# Patient Record
Sex: MALE | Race: WHITE | NOT HISPANIC OR LATINO | Employment: PART TIME | URBAN - METROPOLITAN AREA
[De-identification: names, ages, dates, MRNs, and addresses within clinical notes are randomized per-mention and may not be internally consistent; named-entity substitution may affect disease eponyms.]

---

## 2020-09-22 ENCOUNTER — APPOINTMENT (OUTPATIENT)
Dept: GENERAL RADIOLOGY | Facility: HOSPITAL | Age: 70
End: 2020-09-22

## 2020-09-22 ENCOUNTER — HOSPITAL ENCOUNTER (EMERGENCY)
Facility: HOSPITAL | Age: 70
Discharge: HOME | End: 2020-09-22
Attending: EMERGENCY MEDICINE | Admitting: EMERGENCY MEDICINE

## 2020-09-22 VITALS
HEIGHT: 72 IN | TEMPERATURE: 98.6 F | HEART RATE: 80 BPM | SYSTOLIC BLOOD PRESSURE: 128 MMHG | WEIGHT: 150 LBS | DIASTOLIC BLOOD PRESSURE: 82 MMHG | OXYGEN SATURATION: 92 % | RESPIRATION RATE: 16 BRPM | BODY MASS INDEX: 20.32 KG/M2

## 2020-09-22 DIAGNOSIS — N39.0 RECURRENT UTI: Primary | ICD-10-CM

## 2020-09-22 DIAGNOSIS — R11.2 NON-INTRACTABLE VOMITING WITH NAUSEA, UNSPECIFIED VOMITING TYPE: ICD-10-CM

## 2020-09-22 DIAGNOSIS — R41.0 CONFUSION: ICD-10-CM

## 2020-09-22 LAB
ALBUMIN SERPL-MCNC: 3.4 G/DL (ref 3.5–5.2)
ALBUMIN/GLOB SERPL: 1.1 G/DL
ALP SERPL-CCNC: 67 U/L (ref 39–117)
ALT SERPL W P-5'-P-CCNC: 25 U/L (ref 1–41)
ANION GAP SERPL CALCULATED.3IONS-SCNC: 11 MMOL/L (ref 5–15)
AST SERPL-CCNC: 30 U/L (ref 1–40)
BACTERIA UR QL AUTO: ABNORMAL /HPF
BASOPHILS # BLD AUTO: 0.02 10*3/MM3 (ref 0–0.2)
BASOPHILS NFR BLD AUTO: 0.2 % (ref 0–1.5)
BILIRUB SERPL-MCNC: 1 MG/DL (ref 0–1.2)
BILIRUB UR QL STRIP: NEGATIVE
BUN SERPL-MCNC: 21 MG/DL (ref 8–23)
BUN/CREAT SERPL: 17.8 (ref 7–25)
CALCIUM SPEC-SCNC: 9.3 MG/DL (ref 8.6–10.5)
CHLORIDE SERPL-SCNC: 94 MMOL/L (ref 98–107)
CLARITY UR: ABNORMAL
CO2 SERPL-SCNC: 28 MMOL/L (ref 22–29)
COLOR UR: YELLOW
CREAT SERPL-MCNC: 1.18 MG/DL (ref 0.76–1.27)
DEPRECATED RDW RBC AUTO: 42.6 FL (ref 37–54)
EOSINOPHIL # BLD AUTO: 0.01 10*3/MM3 (ref 0–0.4)
EOSINOPHIL NFR BLD AUTO: 0.1 % (ref 0.3–6.2)
ERYTHROCYTE [DISTWIDTH] IN BLOOD BY AUTOMATED COUNT: 12.7 % (ref 12.3–15.4)
GFR SERPL CREATININE-BSD FRML MDRD: 61 ML/MIN/1.73
GFR SERPL CREATININE-BSD FRML MDRD: 74 ML/MIN/1.73
GLOBULIN UR ELPH-MCNC: 3.2 GM/DL
GLUCOSE SERPL-MCNC: 136 MG/DL (ref 65–99)
GLUCOSE UR STRIP-MCNC: NEGATIVE MG/DL
HCT VFR BLD AUTO: 40.7 % (ref 37.5–51)
HGB BLD-MCNC: 13.5 G/DL (ref 13–17.7)
HGB UR QL STRIP.AUTO: ABNORMAL
HOLD SPECIMEN: NORMAL
HOLD SPECIMEN: NORMAL
HYALINE CASTS UR QL AUTO: ABNORMAL /LPF
IMM GRANULOCYTES # BLD AUTO: 0.07 10*3/MM3 (ref 0–0.05)
IMM GRANULOCYTES NFR BLD AUTO: 0.9 % (ref 0–0.5)
KETONES UR QL STRIP: NEGATIVE
LEUKOCYTE ESTERASE UR QL STRIP.AUTO: ABNORMAL
LYMPHOCYTES # BLD AUTO: 0.72 10*3/MM3 (ref 0.7–3.1)
LYMPHOCYTES NFR BLD AUTO: 8.8 % (ref 19.6–45.3)
MAGNESIUM SERPL-MCNC: 2 MG/DL (ref 1.6–2.4)
MCH RBC QN AUTO: 30.1 PG (ref 26.6–33)
MCHC RBC AUTO-ENTMCNC: 33.2 G/DL (ref 31.5–35.7)
MCV RBC AUTO: 90.8 FL (ref 79–97)
MONOCYTES # BLD AUTO: 0.8 10*3/MM3 (ref 0.1–0.9)
MONOCYTES NFR BLD AUTO: 9.8 % (ref 5–12)
NEUTROPHILS NFR BLD AUTO: 6.57 10*3/MM3 (ref 1.7–7)
NEUTROPHILS NFR BLD AUTO: 80.2 % (ref 42.7–76)
NITRITE UR QL STRIP: POSITIVE
NRBC BLD AUTO-RTO: 0 /100 WBC (ref 0–0.2)
PH UR STRIP.AUTO: 5.5 [PH] (ref 5–8)
PLATELET # BLD AUTO: 127 10*3/MM3 (ref 140–450)
PMV BLD AUTO: 11 FL (ref 6–12)
POTASSIUM SERPL-SCNC: 3.5 MMOL/L (ref 3.5–5.2)
PROT SERPL-MCNC: 6.6 G/DL (ref 6–8.5)
PROT UR QL STRIP: ABNORMAL
RBC # BLD AUTO: 4.48 10*6/MM3 (ref 4.14–5.8)
RBC # UR: ABNORMAL /HPF
REF LAB TEST METHOD: ABNORMAL
SODIUM SERPL-SCNC: 133 MMOL/L (ref 136–145)
SP GR UR STRIP: 1.01 (ref 1–1.03)
SQUAMOUS #/AREA URNS HPF: ABNORMAL /HPF
TROPONIN T SERPL-MCNC: <0.01 NG/ML (ref 0–0.03)
UROBILINOGEN UR QL STRIP: ABNORMAL
WBC # BLD AUTO: 8.19 10*3/MM3 (ref 3.4–10.8)
WBC UR QL AUTO: ABNORMAL /HPF
WHOLE BLOOD HOLD SPECIMEN: NORMAL
WHOLE BLOOD HOLD SPECIMEN: NORMAL

## 2020-09-22 PROCEDURE — 81001 URINALYSIS AUTO W/SCOPE: CPT | Performed by: EMERGENCY MEDICINE

## 2020-09-22 PROCEDURE — 71045 X-RAY EXAM CHEST 1 VIEW: CPT

## 2020-09-22 PROCEDURE — 83735 ASSAY OF MAGNESIUM: CPT | Performed by: EMERGENCY MEDICINE

## 2020-09-22 PROCEDURE — 84484 ASSAY OF TROPONIN QUANT: CPT | Performed by: EMERGENCY MEDICINE

## 2020-09-22 PROCEDURE — 80053 COMPREHEN METABOLIC PANEL: CPT | Performed by: EMERGENCY MEDICINE

## 2020-09-22 PROCEDURE — 87086 URINE CULTURE/COLONY COUNT: CPT | Performed by: EMERGENCY MEDICINE

## 2020-09-22 PROCEDURE — 99285 EMERGENCY DEPT VISIT HI MDM: CPT

## 2020-09-22 PROCEDURE — 93005 ELECTROCARDIOGRAM TRACING: CPT | Performed by: EMERGENCY MEDICINE

## 2020-09-22 PROCEDURE — 96365 THER/PROPH/DIAG IV INF INIT: CPT

## 2020-09-22 PROCEDURE — 87186 SC STD MICRODIL/AGAR DIL: CPT | Performed by: EMERGENCY MEDICINE

## 2020-09-22 PROCEDURE — 25010000002 CEFTRIAXONE PER 250 MG: Performed by: EMERGENCY MEDICINE

## 2020-09-22 PROCEDURE — 85025 COMPLETE CBC W/AUTO DIFF WBC: CPT | Performed by: EMERGENCY MEDICINE

## 2020-09-22 RX ORDER — SIMVASTATIN 40 MG
40 TABLET ORAL NIGHTLY
COMMUNITY

## 2020-09-22 RX ORDER — TAMSULOSIN HYDROCHLORIDE 0.4 MG/1
1 CAPSULE ORAL DAILY
COMMUNITY
End: 2023-03-17 | Stop reason: SDUPTHER

## 2020-09-22 RX ORDER — ASPIRIN 81 MG/1
81 TABLET, CHEWABLE ORAL 2 TIMES DAILY
COMMUNITY

## 2020-09-22 RX ORDER — PREDNISONE 10 MG/1
10 TABLET ORAL DAILY
COMMUNITY
End: 2021-06-03

## 2020-09-22 RX ORDER — POTASSIUM CHLORIDE 1500 MG/1
20 TABLET, FILM COATED, EXTENDED RELEASE ORAL DAILY
COMMUNITY
End: 2020-09-22

## 2020-09-22 RX ORDER — GUAIFENESIN 600 MG/1
1200 TABLET, EXTENDED RELEASE ORAL 2 TIMES DAILY
COMMUNITY
End: 2021-06-03

## 2020-09-22 RX ORDER — POTASSIUM CHLORIDE 750 MG/1
20 TABLET, FILM COATED, EXTENDED RELEASE ORAL 2 TIMES DAILY
COMMUNITY
End: 2021-06-03

## 2020-09-22 RX ORDER — CEFDINIR 300 MG/1
300 CAPSULE ORAL 2 TIMES DAILY
Qty: 14 CAPSULE | Refills: 0 | Status: SHIPPED | OUTPATIENT
Start: 2020-09-22 | End: 2022-10-25

## 2020-09-22 RX ORDER — CARVEDILOL 6.25 MG/1
6.25 TABLET ORAL 2 TIMES DAILY WITH MEALS
COMMUNITY
End: 2021-06-03 | Stop reason: SDUPTHER

## 2020-09-22 RX ORDER — SODIUM CHLORIDE 0.9 % (FLUSH) 0.9 %
10 SYRINGE (ML) INJECTION AS NEEDED
Status: DISCONTINUED | OUTPATIENT
Start: 2020-09-22 | End: 2020-09-22 | Stop reason: HOSPADM

## 2020-09-22 RX ORDER — ALBUTEROL SULFATE 90 UG/1
2 AEROSOL, METERED RESPIRATORY (INHALATION) EVERY 4 HOURS PRN
COMMUNITY

## 2020-09-22 RX ORDER — ONDANSETRON 4 MG/1
4 TABLET, FILM COATED ORAL EVERY 8 HOURS PRN
Qty: 10 TABLET | Refills: 0 | Status: SHIPPED | OUTPATIENT
Start: 2020-09-22 | End: 2022-10-25

## 2020-09-22 RX ADMIN — CEFTRIAXONE SODIUM 2 G: 2 INJECTION, POWDER, FOR SOLUTION INTRAMUSCULAR; INTRAVENOUS at 17:19

## 2020-09-22 NOTE — DISCHARGE INSTRUCTIONS
You need to be rechecked by your primary care provider in 2-3 days.  If you have a bacteria found by culture that will require a different antibiotic than I prescribed, we will call you to make the change.

## 2020-09-22 NOTE — ED PROVIDER NOTES
Subjective   Mr. Condon is a 69 yo male who arrives by EMS with a very confusing story. He tells me that he doesn't want happening to him what has happened to him before then brings up a heart attack and bladder infection requiring admission to [place] months ago. He was told his heart was only working at 12% and that he did have to wear a Life Vest. He does have periodic problems with nausea, sometimes vomiting, but he downplays those symptoms. He says he didn't call EMS and doesn't know why people who look after him did; he just figures that they wanted him to come. He currently denies chest pain, any new trouble breathing, and has not vomited today. No dysuria.       History limited by: vague historian.      Review of Systems   Constitutional: Positive for appetite change (decreased appetite). Negative for fever.   HENT: Negative.    Respiratory: Positive for shortness of breath (chronically, which he blames on COPD).    Cardiovascular: Positive for leg swelling (sometimes). Negative for chest pain.   Gastrointestinal: Positive for diarrhea (he denies diarrhea to me but triage note mentions diarrhea), nausea and vomiting (some days). Negative for abdominal pain.   Genitourinary: Negative for difficulty urinating and dysuria.   Musculoskeletal: Negative for back pain.   Neurological:        He is noting some memory problems.   Psychiatric/Behavioral: Positive for confusion.   All other systems reviewed and are negative.      Past Medical History:   Diagnosis Date   • COPD (chronic obstructive pulmonary disease) (CMS/HCC)    • Heart attack (CMS/HCC)     4-5 months ago   • Hypertension        Not on File    History reviewed. No pertinent surgical history.    History reviewed. No pertinent family history.    Social History     Socioeconomic History   • Marital status:      Spouse name: Not on file   • Number of children: Not on file   • Years of education: Not on file   • Highest education level: Not on  file   Tobacco Use   • Smoking status: Former Smoker   • Smokeless tobacco: Never Used   Substance and Sexual Activity   • Alcohol use: Never     Frequency: Never   • Drug use: Never   • Sexual activity: Not Currently           Objective   Physical Exam  Vitals signs and nursing note reviewed.   Constitutional:       General: He is not in acute distress.     Appearance: He is normal weight.      Comments: Pleasant male who can't tell me why he is here other than that someone else thought he needed to be seen here.   HENT:      Head: Atraumatic.      Mouth/Throat:      Comments: Airway patent    Eyes:      General: No scleral icterus.     Conjunctiva/sclera: Conjunctivae normal.   Neck:      Musculoskeletal: Neck supple.      Trachea: Phonation normal.   Cardiovascular:      Rate and Rhythm: Normal rate and regular rhythm.      Heart sounds: Normal heart sounds.   Pulmonary:      Effort: Pulmonary effort is normal. No respiratory distress.      Breath sounds: Normal breath sounds.   Abdominal:      Palpations: Abdomen is soft.      Tenderness: There is no abdominal tenderness.   Musculoskeletal:      Right lower leg: No edema.      Left lower leg: No edema.   Skin:     General: Skin is warm and dry.   Neurological:      Mental Status: He is alert.      Comments: He appears confused with difficulty relating incidents leading to EMS call today.   Psychiatric:         Behavior: Behavior normal.         Procedures           ED Course  ED Course as of Sep 22 1946   Tue Sep 22, 2020   1606 I have reviewed records from [place] admission in January, 2020. He went in with respiratory failure, NSTEMI with EF 15-20%, COPD, UTI, had heart cath showing no stentable lesions, diffuse disease.    [LI]   1704 Only significant finding here is UTI, culture pending. His wife is not hear, nurse FREDA Dasilva is trying to find her. It appears he will need outpatient antibiotics and PCP follow-up.    [LI]      ED Course User Index  [LI] Abilio  Arslan RODRÍGUEZ MD                                   Recent Results (from the past 24 hour(s))   Comprehensive Metabolic Panel    Collection Time: 09/22/20  2:49 PM    Specimen: Blood   Result Value Ref Range    Glucose 136 (H) 65 - 99 mg/dL    BUN 21 8 - 23 mg/dL    Creatinine 1.18 0.76 - 1.27 mg/dL    Sodium 133 (L) 136 - 145 mmol/L    Potassium 3.5 3.5 - 5.2 mmol/L    Chloride 94 (L) 98 - 107 mmol/L    CO2 28.0 22.0 - 29.0 mmol/L    Calcium 9.3 8.6 - 10.5 mg/dL    Total Protein 6.6 6.0 - 8.5 g/dL    Albumin 3.40 (L) 3.50 - 5.20 g/dL    ALT (SGPT) 25 1 - 41 U/L    AST (SGOT) 30 1 - 40 U/L    Alkaline Phosphatase 67 39 - 117 U/L    Total Bilirubin 1.0 0.0 - 1.2 mg/dL    eGFR Non African Amer 61 >60 mL/min/1.73    eGFR  African Amer 74 >60 mL/min/1.73    Globulin 3.2 gm/dL    A/G Ratio 1.1 g/dL    BUN/Creatinine Ratio 17.8 7.0 - 25.0    Anion Gap 11.0 5.0 - 15.0 mmol/L   Troponin    Collection Time: 09/22/20  2:49 PM    Specimen: Blood   Result Value Ref Range    Troponin T <0.010 0.000 - 0.030 ng/mL   Magnesium    Collection Time: 09/22/20  2:49 PM    Specimen: Blood   Result Value Ref Range    Magnesium 2.0 1.6 - 2.4 mg/dL   Light Blue Top    Collection Time: 09/22/20  2:49 PM   Result Value Ref Range    Extra Tube hold for add-on    Green Top (Gel)    Collection Time: 09/22/20  2:49 PM   Result Value Ref Range    Extra Tube Hold for add-ons.    Lavender Top    Collection Time: 09/22/20  2:49 PM   Result Value Ref Range    Extra Tube hold for add-on    Gold Top - SST    Collection Time: 09/22/20  2:49 PM   Result Value Ref Range    Extra Tube Hold for add-ons.    CBC Auto Differential    Collection Time: 09/22/20  2:49 PM    Specimen: Blood   Result Value Ref Range    WBC 8.19 3.40 - 10.80 10*3/mm3    RBC 4.48 4.14 - 5.80 10*6/mm3    Hemoglobin 13.5 13.0 - 17.7 g/dL    Hematocrit 40.7 37.5 - 51.0 %    MCV 90.8 79.0 - 97.0 fL    MCH 30.1 26.6 - 33.0 pg    MCHC 33.2 31.5 - 35.7 g/dL    RDW 12.7 12.3 - 15.4 %    RDW-SD  42.6 37.0 - 54.0 fl    MPV 11.0 6.0 - 12.0 fL    Platelets 127 (L) 140 - 450 10*3/mm3    Neutrophil % 80.2 (H) 42.7 - 76.0 %    Lymphocyte % 8.8 (L) 19.6 - 45.3 %    Monocyte % 9.8 5.0 - 12.0 %    Eosinophil % 0.1 (L) 0.3 - 6.2 %    Basophil % 0.2 0.0 - 1.5 %    Immature Grans % 0.9 (H) 0.0 - 0.5 %    Neutrophils, Absolute 6.57 1.70 - 7.00 10*3/mm3    Lymphocytes, Absolute 0.72 0.70 - 3.10 10*3/mm3    Monocytes, Absolute 0.80 0.10 - 0.90 10*3/mm3    Eosinophils, Absolute 0.01 0.00 - 0.40 10*3/mm3    Basophils, Absolute 0.02 0.00 - 0.20 10*3/mm3    Immature Grans, Absolute 0.07 (H) 0.00 - 0.05 10*3/mm3    nRBC 0.0 0.0 - 0.2 /100 WBC   Urinalysis With Microscopic If Indicated (No Culture) - Urine, Clean Catch    Collection Time: 09/22/20  3:56 PM    Specimen: Urine, Clean Catch   Result Value Ref Range    Color, UA Yellow Yellow, Straw    Appearance, UA Turbid (A) Clear    pH, UA 5.5 5.0 - 8.0    Specific Gravity, UA 1.014 1.001 - 1.030    Glucose, UA Negative Negative    Ketones, UA Negative Negative    Bilirubin, UA Negative Negative    Blood, UA Moderate (2+) (A) Negative    Protein,  mg/dL (2+) (A) Negative    Leuk Esterase, UA Large (3+) (A) Negative    Nitrite, UA Positive (A) Negative    Urobilinogen, UA 1.0 E.U./dL 0.2 - 1.0 E.U./dL   Urinalysis, Microscopic Only - Urine, Clean Catch    Collection Time: 09/22/20  3:56 PM    Specimen: Urine, Clean Catch   Result Value Ref Range    RBC, UA 3-6 (A) None Seen, 0-2 /HPF    WBC, UA Too Numerous to Count (A) None Seen, 0-2 /HPF    Bacteria, UA 3+ (A) None Seen, Trace /HPF    Squamous Epithelial Cells, UA 0-2 None Seen, 0-2 /HPF    Hyaline Casts, UA 7-12 0 - 6 /LPF    Methodology Manual Light Microscopy      Note: In addition to lab results from this visit, the labs listed above may include labs taken at another facility or during a different encounter within the last 24 hours. Please correlate lab times with ED admission and discharge times for further  clarification of the services performed during this visit.    XR Chest 1 View   Preliminary Result   No evidence of active chest disease.       D:  09/22/2020   E:  09/22/2020                 Vitals:    09/22/20 1629 09/22/20 1700 09/22/20 1730 09/22/20 1800   BP: 107/72 121/74 127/73 128/82   BP Location:       Patient Position:       Pulse: 78 75 78 80   Resp:       Temp:       TempSrc:       SpO2: 94% 93% 92%    Weight:       Height:         Medications   sodium chloride 0.9 % flush 10 mL (has no administration in time range)   cefTRIAXone (ROCEPHIN) 2 g/100 mL 0.9% NS IVPB (MBP) (0 g Intravenous Stopped 9/22/20 1756)     ECG/EMG Results (last 24 hours)     Procedure Component Value Units Date/Time    ECG 12 Lead [[id number]] Collected: 09/22/20 1442     Updated: 09/22/20 1443        ECG 12 Lead                     MDM    Final diagnoses:   Recurrent UTI   Non-intractable vomiting with nausea, unspecified vomiting type   Confusion            Arslan Knox MD  09/22/20 1946

## 2020-09-24 LAB — BACTERIA SPEC AEROBE CULT: ABNORMAL

## 2021-03-05 DIAGNOSIS — I10 ESSENTIAL HYPERTENSION, BENIGN: Primary | ICD-10-CM

## 2021-03-05 RX ORDER — SACUBITRIL AND VALSARTAN 24; 26 MG/1; MG/1
1 TABLET, FILM COATED ORAL 2 TIMES DAILY
Qty: 180 TABLET | Refills: 3 | Status: SHIPPED | OUTPATIENT
Start: 2021-03-05 | End: 2021-03-17 | Stop reason: SDUPTHER

## 2021-03-16 DIAGNOSIS — I10 ESSENTIAL HYPERTENSION, BENIGN: ICD-10-CM

## 2021-03-17 RX ORDER — SACUBITRIL AND VALSARTAN 24; 26 MG/1; MG/1
1 TABLET, FILM COATED ORAL 2 TIMES DAILY
Qty: 180 TABLET | Refills: 3 | Status: SHIPPED | OUTPATIENT
Start: 2021-03-17 | End: 2021-06-17

## 2021-04-23 DIAGNOSIS — I10 ESSENTIAL HYPERTENSION, BENIGN: ICD-10-CM

## 2021-04-23 NOTE — TELEPHONE ENCOUNTER
Need PA for sacubitril-valsartan (Entresto) 24-26 MG tablet [[id number]] (Order [id number])        [id number] [place] [address] [facility] [place] [facility] - [phone] [facility] [phone] FX

## 2021-04-30 RX ORDER — SACUBITRIL AND VALSARTAN 24; 26 MG/1; MG/1
1 TABLET, FILM COATED ORAL 2 TIMES DAILY
Qty: 180 TABLET | Refills: 3 | OUTPATIENT
Start: 2021-04-30

## 2021-04-30 NOTE — TELEPHONE ENCOUNTER
PTS WIFE CALLED STATED THEY HAVE BEEN TRYING TO GET MEDICATION ENTRECHRISTIANO  PA. STATED THE PHARMACY HAS SENT FORMS TO US CONCERNING THE MEDS. PT HAS BEEN COMPLETELY OUT FOR 3 DAYS.

## 2021-05-11 ENCOUNTER — TELEPHONE (OUTPATIENT)
Dept: CARDIOLOGY | Facility: CLINIC | Age: 71
End: 2021-05-11

## 2021-06-03 ENCOUNTER — OFFICE VISIT (OUTPATIENT)
Dept: CARDIOLOGY | Facility: CLINIC | Age: 71
End: 2021-06-03

## 2021-06-03 VITALS
RESPIRATION RATE: 11 BRPM | TEMPERATURE: 98 F | WEIGHT: 169 LBS | SYSTOLIC BLOOD PRESSURE: 144 MMHG | HEART RATE: 86 BPM | HEIGHT: 72 IN | BODY MASS INDEX: 22.89 KG/M2 | OXYGEN SATURATION: 96 % | DIASTOLIC BLOOD PRESSURE: 88 MMHG

## 2021-06-03 DIAGNOSIS — I51.81 STRESS-INDUCED CARDIOMYOPATHY: Primary | ICD-10-CM

## 2021-06-03 PROCEDURE — 99214 OFFICE O/P EST MOD 30 MIN: CPT | Performed by: INTERNAL MEDICINE

## 2021-06-03 PROCEDURE — 93000 ELECTROCARDIOGRAM COMPLETE: CPT | Performed by: INTERNAL MEDICINE

## 2021-06-03 RX ORDER — CARVEDILOL 6.25 MG/1
6.25 TABLET ORAL 2 TIMES DAILY WITH MEALS
Qty: 180 TABLET | Refills: 3 | Status: SHIPPED | OUTPATIENT
Start: 2021-06-03 | End: 2022-08-15

## 2021-06-03 NOTE — PROGRESS NOTES
[facility]  [facility] CARDIOLOGY  [address] [place] [address]  Dept: [phone]  Dept Fax: [phone]    Silviano Condon  1950    Follow Up Office Visit Note    History of Present Illness:  Silviano Condon is a 71 y.o. male who presents to the clinic for Follow-up. Stress cardiomyopathy- He seems have SOB on walking , mild edema, his Ef normalized last year to 55% but he feels windy and he wants to downgrade the Entresto can not afford it, will get an echo, he is on low dose Coreg 6,25 bid, and Entresto to 24.26 bid, will decided after the echo, EKG sinus with Hr 67    The following portions of the patient's history were reviewed and updated as appropriate: allergies, current medications, past family history, past medical history, past social history, past surgical history and problem list.    Medications:  albuterol sulfate HFA  aspirin  carvedilol  cefdinir  DULERA IN  Entresto tablet  ondansetron  simvastatin  tamsulosin capsule    Subjective  Allergies   Allergen Reactions   • Ciprofloxacin Hives        Past Medical History:   Diagnosis Date   • CAD (coronary artery disease)    • Chronic osteoarthritis    • Colon cancer (CMS/HCC)    • COPD (chronic obstructive pulmonary disease) (CMS/HCC)    • Heart attack (CMS/HCC)     4-5 months ago   • Hx of colonic polyps    • Hyperlipidemia    • Hypertension    • Hypertension    • Ischemic cardiomyopathy    • Noncompliance    • Tobacco abuse     PREVIOUS       Past Surgical History:   Procedure Laterality Date   • COLON RESECTION  2014    DR. AMAYA   • POLYPECTOMY         Family History   Problem Relation Age of Onset   • Diabetes Mother    • Hypertension Other         Social History     Socioeconomic History   • Marital status:      Spouse name: Not on file   • Number of children: Not on file   • Years of education: Not on file   • Highest education level: Not on file   Tobacco Use   • Smoking status: Former Smoker  "    Packs/day: 1.00     Types: Cigarettes     Quit date:      Years since quittin.4   • Smokeless tobacco: Never Used   Vaping Use   • Vaping Use: Never used   Substance and Sexual Activity   • Alcohol use: Never   • Drug use: Never   • Sexual activity: Not Currently       Review of Systems   Respiratory: Positive for shortness of breath.    All other systems reviewed and are negative.      Cardiovascular Procedures    ECHO/MUGA:   STRESS TESTS:   CARDIAC CATH:   DEVICES:   HOLTER:   CT/MRI:   VASCULAR:   CARDIOTHORACIC:     Objective  Vitals:    21 1332   BP: 144/88   BP Location: Left arm   Patient Position: Lying   Cuff Size: Adult   Pulse: 86   Resp: 11   Temp: 98 °F (36.7 °C)   TempSrc: Infrared   SpO2: 96%   Weight: 76.7 kg (169 lb)   Height: 182.9 cm (72\")   PainSc: 0-No pain     Body mass index is 22.92 kg/m².     Physical Exam  Constitutional:       Appearance: Healthy appearance. Not in distress.   Neck:      Vascular: No JVR. JVD normal.   Pulmonary:      Effort: Pulmonary effort is normal.      Breath sounds: Normal breath sounds. No wheezing. No rhonchi. No rales.   Chest:      Chest wall: Not tender to palpatation.   Cardiovascular:      PMI at left midclavicular line. Normal rate. Regular rhythm. Normal S1. Normal S2.      Murmurs: There is no murmur.      No gallop. No click. No rub.   Pulses:     Intact distal pulses.   Edema:     Peripheral edema absent.   Abdominal:      General: Bowel sounds are normal.      Palpations: Abdomen is soft.      Tenderness: There is no abdominal tenderness.   Musculoskeletal: Normal range of motion.         General: No tenderness. Skin:     General: Skin is warm and dry.   Neurological:      General: No focal deficit present.      Mental Status: Alert and oriented to person, place and time.          Diagnostic Data    ECG 12 Lead    Date/Time: 6/3/2021 1:58 PM  Performed by: Douglas Collins MD  Authorized by: Douglas Collins MD "   Comparison: compared with previous ECG   Similar to previous ECG  Rhythm: sinus rhythm  Rate: normal  BPM: 67  Q waves: V1 and V2    QRS axis: left    Clinical impression: abnormal EKG            Assessment and Plan  Diagnoses and all orders for this visit:    Stress-induced cardiomyopathy-Will get an echo, keep Entresto for now, BP is 130/.80 we might need to use Aldactone and also Valsartan   -     Adult Transthoracic Echo Complete W/ Cont if Necessary Per Protocol; Future    Other orders  -     carvedilol (COREG) 6.25 MG tablet; Take 1 tablet by mouth 2 (Two) Times a Day With Meals.         No follow-ups on file.    Douglas Collins MD  06/03/2021

## 2021-06-14 ENCOUNTER — LAB (OUTPATIENT)
Dept: CARDIOLOGY | Facility: CLINIC | Age: 71
End: 2021-06-14

## 2021-06-14 DIAGNOSIS — E78.2 MIXED HYPERLIPIDEMIA: ICD-10-CM

## 2021-06-14 DIAGNOSIS — I10 ESSENTIAL HYPERTENSION, BENIGN: Primary | ICD-10-CM

## 2021-06-15 LAB
ALBUMIN SERPL-MCNC: 4.1 G/DL (ref 3.8–4.8)
ALBUMIN/GLOB SERPL: 1.4 {RATIO} (ref 1.2–2.2)
ALP SERPL-CCNC: 93 IU/L (ref 48–121)
ALT SERPL-CCNC: 12 IU/L (ref 0–44)
AST SERPL-CCNC: 20 IU/L (ref 0–40)
BASOPHILS # BLD AUTO: 0 X10E3/UL (ref 0–0.2)
BASOPHILS NFR BLD AUTO: 1 %
BILIRUB SERPL-MCNC: 0.3 MG/DL (ref 0–1.2)
BUN SERPL-MCNC: 22 MG/DL (ref 8–27)
BUN/CREAT SERPL: 17 (ref 10–24)
CALCIUM SERPL-MCNC: 9.2 MG/DL (ref 8.6–10.2)
CHLORIDE SERPL-SCNC: 106 MMOL/L (ref 96–106)
CHOLEST SERPL-MCNC: 132 MG/DL (ref 100–199)
CK SERPL-CCNC: 180 U/L (ref 41–331)
CO2 SERPL-SCNC: 19 MMOL/L (ref 20–29)
CREAT SERPL-MCNC: 1.3 MG/DL (ref 0.76–1.27)
EOSINOPHIL # BLD AUTO: 0.1 X10E3/UL (ref 0–0.4)
EOSINOPHIL NFR BLD AUTO: 3 %
ERYTHROCYTE [DISTWIDTH] IN BLOOD BY AUTOMATED COUNT: 12.2 % (ref 11.6–15.4)
GLOBULIN SER CALC-MCNC: 2.9 G/DL (ref 1.5–4.5)
GLUCOSE SERPL-MCNC: 109 MG/DL (ref 65–99)
HCT VFR BLD AUTO: 41.2 % (ref 37.5–51)
HDLC SERPL-MCNC: 42 MG/DL
HGB BLD-MCNC: 13.1 G/DL (ref 13–17.7)
IMM GRANULOCYTES # BLD AUTO: 0 X10E3/UL (ref 0–0.1)
IMM GRANULOCYTES NFR BLD AUTO: 1 %
LDLC SERPL CALC-MCNC: 74 MG/DL (ref 0–99)
LYMPHOCYTES # BLD AUTO: 1.3 X10E3/UL (ref 0.7–3.1)
LYMPHOCYTES NFR BLD AUTO: 30 %
MCH RBC QN AUTO: 30.1 PG (ref 26.6–33)
MCHC RBC AUTO-ENTMCNC: 31.8 G/DL (ref 31.5–35.7)
MCV RBC AUTO: 95 FL (ref 79–97)
MONOCYTES # BLD AUTO: 0.2 X10E3/UL (ref 0.1–0.9)
MONOCYTES NFR BLD AUTO: 6 %
NEUTROPHILS # BLD AUTO: 2.7 X10E3/UL (ref 1.4–7)
NEUTROPHILS NFR BLD AUTO: 59 %
PLATELET # BLD AUTO: 168 X10E3/UL (ref 150–450)
POTASSIUM SERPL-SCNC: 4.7 MMOL/L (ref 3.5–5.2)
PROT SERPL-MCNC: 7 G/DL (ref 6–8.5)
RBC # BLD AUTO: 4.35 X10E6/UL (ref 4.14–5.8)
SODIUM SERPL-SCNC: 138 MMOL/L (ref 134–144)
TRIGL SERPL-MCNC: 81 MG/DL (ref 0–149)
VLDLC SERPL CALC-MCNC: 16 MG/DL (ref 5–40)
WBC # BLD AUTO: 4.4 X10E3/UL (ref 3.4–10.8)

## 2021-06-17 ENCOUNTER — OFFICE VISIT (OUTPATIENT)
Dept: CARDIOLOGY | Facility: CLINIC | Age: 71
End: 2021-06-17

## 2021-06-17 VITALS
TEMPERATURE: 97 F | HEIGHT: 72 IN | WEIGHT: 168 LBS | HEART RATE: 82 BPM | SYSTOLIC BLOOD PRESSURE: 130 MMHG | BODY MASS INDEX: 22.75 KG/M2 | RESPIRATION RATE: 11 BRPM | DIASTOLIC BLOOD PRESSURE: 82 MMHG | OXYGEN SATURATION: 96 %

## 2021-06-17 DIAGNOSIS — I51.81 STRESS-INDUCED CARDIOMYOPATHY: Primary | ICD-10-CM

## 2021-06-17 PROCEDURE — 99214 OFFICE O/P EST MOD 30 MIN: CPT | Performed by: INTERNAL MEDICINE

## 2021-06-17 RX ORDER — VALSARTAN 160 MG/1
160 TABLET ORAL DAILY
Qty: 30 TABLET | Refills: 11 | Status: SHIPPED | OUTPATIENT
Start: 2021-06-17 | End: 2022-02-01

## 2021-06-17 NOTE — PROGRESS NOTES
[facility]  [facility] CARDIOLOGY  [address] [place] [address]  Dept: [phone]  Dept Fax: [phone]    Silviano Condon  1950    Follow Up Office Visit Note    History of Present Illness:  Silviano Condon is a 71 y.o. male who presents to the clinic for Follow-up and Dizziness. He has stress cardiomyopathy, doing better but feels dizzy, his new echo showed normalization of the Ef,his cardiac cath showed mild disease, can not afford Entresto, will use valsartan 160 mg., keep Coreg 6.25 bid, He feels dizzy, standing, his BP drops to 100.60 from 120.80, likely off Entresto, will help him    The following portions of the patient's history were reviewed and updated as appropriate: allergies, current medications, past family history, past medical history, past social history, past surgical history and problem list.    Medications:  albuterol sulfate HFA  aspirin  carvedilol  cefdinir  DULERA IN  ondansetron  simvastatin  tamsulosin capsule  valsartan    Subjective  Allergies   Allergen Reactions   • Ciprofloxacin Hives        Past Medical History:   Diagnosis Date   • CAD (coronary artery disease)    • Chronic osteoarthritis    • Colon cancer (CMS/HCC)    • COPD (chronic obstructive pulmonary disease) (CMS/HCC)    • Heart attack (CMS/HCC)     4-5 months ago   • Hx of colonic polyps    • Hyperlipidemia    • Hypertension    • Hypertension    • Ischemic cardiomyopathy    • Noncompliance    • Tobacco abuse     PREVIOUS       Past Surgical History:   Procedure Laterality Date   • COLON RESECTION  2014    DR. AMAYA   • POLYPECTOMY         Family History   Problem Relation Age of Onset   • Diabetes Mother    • Hypertension Other         Social History     Socioeconomic History   • Marital status:      Spouse name: Not on file   • Number of children: Not on file   • Years of education: Not on file   • Highest education level: Not on file   Tobacco Use   • Smoking status:  "Former Smoker     Packs/day: 1.00     Types: Cigarettes     Quit date:      Years since quittin.4   • Smokeless tobacco: Never Used   Vaping Use   • Vaping Use: Never used   Substance and Sexual Activity   • Alcohol use: Never   • Drug use: Never   • Sexual activity: Not Currently       Review of Systems   Neurological: Positive for dizziness.   All other systems reviewed and are negative.      Cardiovascular Procedures    ECHO/MUGA:   STRESS TESTS:   CARDIAC CATH:   DEVICES:   HOLTER:   CT/MRI:   VASCULAR:   CARDIOTHORACIC:     Objective  Vitals:    21 1508   BP: 130/82   BP Location: Right arm   Patient Position: Lying   Cuff Size: Adult   Pulse: 82   Resp: 11   Temp: 97 °F (36.1 °C)   TempSrc: Infrared   SpO2: 96%   Weight: 76.2 kg (168 lb)   Height: 182.9 cm (72\")   PainSc: 0-No pain     Body mass index is 22.78 kg/m².     Physical Exam  Constitutional:       Appearance: Healthy appearance. Not in distress.   Neck:      Vascular: No JVR. JVD normal.   Pulmonary:      Effort: Pulmonary effort is normal.      Breath sounds: Normal breath sounds. No wheezing. No rhonchi. No rales.   Chest:      Chest wall: Not tender to palpatation.   Cardiovascular:      PMI at left midclavicular line. Normal rate. Regular rhythm. Normal S1. Normal S2.      Murmurs: There is no murmur.      No gallop. No click. No rub.   Pulses:     Intact distal pulses.   Edema:     Peripheral edema absent.   Abdominal:      General: Bowel sounds are normal.      Palpations: Abdomen is soft.      Tenderness: There is no abdominal tenderness.   Musculoskeletal: Normal range of motion.         General: No tenderness. Skin:     General: Skin is warm and dry.   Neurological:      General: No focal deficit present.      Mental Status: Alert and oriented to person, place and time.          Diagnostic Data  Procedures    Assessment and Plan  Diagnoses and all orders for this visit:    Stress-induced cardiomyopathy- Will use valsartan 160 " mg keep Coreg 6.25 bid, Ef normalized, can not afford entresto    Other orders  -     valsartan (DIOVAN) 160 MG tablet; Take 1 tablet by mouth Daily.         Return in about 6 months (around 12/17/2021) for Recheck.    Douglas Collins MD  06/17/2021

## 2021-07-12 ENCOUNTER — TELEPHONE (OUTPATIENT)
Dept: CARDIOLOGY | Facility: CLINIC | Age: 71
End: 2021-07-12

## 2021-07-12 NOTE — TELEPHONE ENCOUNTER
If pt calls back this was an apt that needs to cancel . Pt has apt in dec please keep it . Left message for pt .

## 2022-02-01 ENCOUNTER — OFFICE VISIT (OUTPATIENT)
Dept: CARDIOLOGY | Facility: CLINIC | Age: 72
End: 2022-02-01

## 2022-02-01 VITALS
HEIGHT: 72 IN | SYSTOLIC BLOOD PRESSURE: 166 MMHG | TEMPERATURE: 97.1 F | RESPIRATION RATE: 15 BRPM | BODY MASS INDEX: 23.7 KG/M2 | HEART RATE: 66 BPM | DIASTOLIC BLOOD PRESSURE: 96 MMHG | WEIGHT: 175 LBS | OXYGEN SATURATION: 95 %

## 2022-02-01 DIAGNOSIS — I10 ESSENTIAL HYPERTENSION: ICD-10-CM

## 2022-02-01 DIAGNOSIS — E78.00 HYPERCHOLESTEROLEMIA: ICD-10-CM

## 2022-02-01 DIAGNOSIS — I51.81 STRESS-INDUCED CARDIOMYOPATHY: Primary | ICD-10-CM

## 2022-02-01 PROCEDURE — 99214 OFFICE O/P EST MOD 30 MIN: CPT | Performed by: INTERNAL MEDICINE

## 2022-02-01 RX ORDER — VALSARTAN AND HYDROCHLOROTHIAZIDE 320; 12.5 MG/1; MG/1
1 TABLET, FILM COATED ORAL DAILY
Qty: 30 TABLET | Refills: 11 | Status: SHIPPED | OUTPATIENT
Start: 2022-02-01 | End: 2022-03-01

## 2022-02-01 NOTE — PROGRESS NOTES
[facility]  [facility] CARDIOLOGY  [address] [place] [address]  Dept: [phone]  Dept Fax: [phone]    Silviano Condon  1950    Follow Up Office Visit Note    History of Present Illness:  Silviano Condon is a 72 y.o. male who presents to the clinic for Follow-up. Stress cardiomyopathy-He seems doing well, no complaints but has 1 plus edema plus his BP is 160.80, will increase Valsartan to 320 plus add HCTZ 12,5  Daily, will keep Coreg 6,25 bid, his Ef normalized, he could not afford Entresto and we just change to valsartan once month ago    The following portions of the patient's history were reviewed and updated as appropriate: allergies, current medications, past family history, past medical history, past social history, past surgical history and problem list.    Medications:  albuterol sulfate HFA  aspirin  carvedilol  cefdinir  DULERA IN  ondansetron  simvastatin  tamsulosin capsule  valsartan-hydrochlorothiazide    Subjective  Allergies   Allergen Reactions   • Ciprofloxacin Hives        Past Medical History:   Diagnosis Date   • CAD (coronary artery disease)    • Chronic osteoarthritis    • Colon cancer (HCC)    • COPD (chronic obstructive pulmonary disease) (HCC)    • Heart attack (HCC)     4-5 months ago   • Hx of colonic polyps    • Hyperlipidemia    • Hypertension    • Hypertension    • Ischemic cardiomyopathy    • Noncompliance    • Tobacco abuse     PREVIOUS       Past Surgical History:   Procedure Laterality Date   • COLON RESECTION      DR. AMAYA   • POLYPECTOMY         Family History   Problem Relation Age of Onset   • Diabetes Mother    • Hypertension Other         Social History     Socioeconomic History   • Marital status:    Tobacco Use   • Smoking status: Former Smoker     Packs/day: 1.00     Types: Cigarettes     Quit date:      Years since quittin.1   • Smokeless tobacco: Never Used   Vaping Use   • Vaping Use: Never used  "  Substance and Sexual Activity   • Alcohol use: Never   • Drug use: Never   • Sexual activity: Not Currently       Review of Systems   Constitutional: Negative.    HENT: Negative.    Respiratory: Negative.    Cardiovascular: Negative.    Endocrine: Negative.    Genitourinary: Negative.    Musculoskeletal: Negative.    Skin: Negative.    Allergic/Immunologic: Negative.    Neurological: Negative.    Hematological: Negative.    Psychiatric/Behavioral: Negative.    All other systems reviewed and are negative.      Cardiovascular Procedures    ECHO/MUGA:   STRESS TESTS:   CARDIAC CATH:   DEVICES:   HOLTER:   CT/MRI:   VASCULAR:   CARDIOTHORACIC:     Objective  Vitals:    02/01/22 1500   BP: 166/96   BP Location: Right arm   Patient Position: Sitting   Cuff Size: Adult   Pulse: 66   Resp: 15   Temp: 97.1 °F (36.2 °C)   TempSrc: Infrared   SpO2: 95%   Weight: 79.4 kg (175 lb)   Height: 182.9 cm (72\")   PainSc: 0-No pain     Body mass index is 23.73 kg/m².     Physical Exam  Constitutional:       Appearance: Healthy appearance. Not in distress.   Neck:      Vascular: No JVR. JVD normal.   Pulmonary:      Effort: Pulmonary effort is normal.      Breath sounds: Normal breath sounds. No wheezing. No rhonchi. No rales.   Chest:      Chest wall: Not tender to palpatation.   Cardiovascular:      PMI at left midclavicular line. Normal rate. Regular rhythm. Normal S1. Normal S2.      Murmurs: There is no murmur.      No gallop. No click. No rub.   Pulses:     Intact distal pulses.   Edema:     Peripheral edema absent.   Abdominal:      General: Bowel sounds are normal.      Palpations: Abdomen is soft.      Tenderness: There is no abdominal tenderness.   Musculoskeletal: Normal range of motion.         General: No tenderness. Skin:     General: Skin is warm and dry.   Neurological:      General: No focal deficit present.      Mental Status: Alert and oriented to person, place and time.          Diagnostic " Data  Procedures    Assessment and Plan  Diagnoses and all orders for this visit:    Stress-induced cardiomyopathy-, The Ef normalized, will increase valsartan to 320 and add HCTZ 12,5 , keep Coreg 6,25 bid    Essential hypertension- The BP is 160.80, will increase Valsartan and also add HCTZ, keep coreg     Hypercholesterolemia- On Simvastatin 40 mg    Other orders  -     valsartan-hydrochlorothiazide (DIOVAN-HCT) 320-12.5 MG per tablet; Take 1 tablet by mouth Daily.         Return in about 1 month (around 3/1/2022) for Recheck.    Douglas Collins MD  02/01/2022

## 2022-03-01 ENCOUNTER — OFFICE VISIT (OUTPATIENT)
Dept: CARDIOLOGY | Facility: CLINIC | Age: 72
End: 2022-03-01

## 2022-03-01 VITALS
OXYGEN SATURATION: 99 % | HEART RATE: 67 BPM | WEIGHT: 177 LBS | SYSTOLIC BLOOD PRESSURE: 150 MMHG | DIASTOLIC BLOOD PRESSURE: 82 MMHG | BODY MASS INDEX: 23.98 KG/M2 | HEIGHT: 72 IN | TEMPERATURE: 96 F | RESPIRATION RATE: 14 BRPM

## 2022-03-01 DIAGNOSIS — I10 ESSENTIAL HYPERTENSION: ICD-10-CM

## 2022-03-01 DIAGNOSIS — E78.00 HYPERCHOLESTEROLEMIA: ICD-10-CM

## 2022-03-01 DIAGNOSIS — I51.81 STRESS-INDUCED CARDIOMYOPATHY: Primary | ICD-10-CM

## 2022-03-01 PROCEDURE — 99214 OFFICE O/P EST MOD 30 MIN: CPT | Performed by: INTERNAL MEDICINE

## 2022-03-01 NOTE — PROGRESS NOTES
[facility] [place]  [facility] CARDIOLOGY  [address] [place] [address]  Dept: [phone]  Dept Fax: [phone]    Silviano Condon  1950    Follow Up Office Visit Note    History of Present Illness:  Silviano Condon is a 72 y.o. male who presents to the clinic for Follow-up. 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    Medications:  albuterol sulfate HFA  aspirin  carvedilol  cefdinir  DULERA IN  ondansetron  simvastatin  tamsulosin capsule    Subjective  Allergies   Allergen Reactions   • Ciprofloxacin Hives        Past Medical History:   Diagnosis Date   • CAD (coronary artery disease)    • Chronic osteoarthritis    • Colon cancer (HCC)    • COPD (chronic obstructive pulmonary disease) (HCC)    • Heart attack (HCC)     4-5 months ago   • Hx of colonic polyps    • Hyperlipidemia    • Hypertension    • Hypertension    • Ischemic cardiomyopathy    • Noncompliance    • Tobacco abuse     PREVIOUS       Past Surgical History:   Procedure Laterality Date   • COLON RESECTION      DR. AMAYA   • POLYPECTOMY         Family History   Problem Relation Age of Onset   • Diabetes Mother    • Hypertension Other         Social History     Socioeconomic History   • Marital status:    Tobacco Use   • Smoking status: Former Smoker     Packs/day: 1.00     Types: Cigarettes     Quit date:      Years since quittin.1   • Smokeless tobacco: Never Used   Vaping Use   • Vaping Use: Never used   Substance and Sexual Activity   • Alcohol use: Never   • Drug use: Never   • Sexual activity: Not Currently       Review of Systems   Constitutional: Negative.    HENT: Negative.    Respiratory: Negative.    Cardiovascular: Negative.   "  Endocrine: Negative.    Genitourinary: Negative.    Musculoskeletal: Negative.    Skin: Negative.    Allergic/Immunologic: Negative.    Neurological: Negative.    Hematological: Negative.    Psychiatric/Behavioral: Negative.    All other systems reviewed and are negative.      Cardiovascular Procedures    ECHO/MUGA:   STRESS TESTS:   CARDIAC CATH:   DEVICES:   HOLTER:   CT/MRI:   VASCULAR:   CARDIOTHORACIC:     Objective  Vitals:    03/01/22 1406   BP: 150/82   BP Location: Left arm   Patient Position: Sitting   Cuff Size: Adult   Pulse: 67   Resp: 14   Temp: 96 °F (35.6 °C)   TempSrc: Infrared   SpO2: 99%   Weight: 80.3 kg (177 lb)   Height: 182.9 cm (72\")   PainSc: 0-No pain     Body mass index is 24.01 kg/m².     Physical Exam  Constitutional:       Appearance: Healthy appearance. Not in distress.   Neck:      Vascular: No JVR. JVD normal.   Pulmonary:      Effort: Pulmonary effort is normal.      Breath sounds: Normal breath sounds. No wheezing. No rhonchi. No rales.   Chest:      Chest wall: Not tender to palpatation.   Cardiovascular:      PMI at left midclavicular line. Normal rate. Regular rhythm. Normal S1. Normal S2.      Murmurs: There is no murmur.      No gallop. No click. No rub.   Pulses:     Intact distal pulses.   Edema:     Peripheral edema absent.   Abdominal:      General: Bowel sounds are normal.      Palpations: Abdomen is soft.      Tenderness: There is no abdominal tenderness.   Musculoskeletal: Normal range of motion.         General: No tenderness. Skin:     General: Skin is warm and dry.   Neurological:      General: No focal deficit present.      Mental Status: Alert and oriented to person, place and time.          Diagnostic Data  Procedures    Assessment and Plan  Diagnoses and all orders for this visit:    Stress-induced cardiomyopathy- His Ef normalized no complaints,  -     CBC & Differential  -     Basic Metabolic Panel    Essential hypertension- BP here is high last month we " increase Valsartan to 320. With HCTZ, few days later was complaining of dizziness, his PCP lower the Valsartan, he then stop tehm because he thinks the meds are causing  The dizziness , here he is not orthostatic .80 lying and standing, we might need to add Amlodipine instead, will get lab, will keep Coreg 6,25 bid  -     CBC & Differential  -     Basic Metabolic Panel    Hypercholesterolemia- On Simvastatin         No follow-ups on file.    Douglas Collins MD  03/01/2022

## 2022-03-02 ENCOUNTER — TELEPHONE (OUTPATIENT)
Dept: CARDIOLOGY | Facility: CLINIC | Age: 72
End: 2022-03-02

## 2022-03-02 LAB
BASOPHILS # BLD AUTO: 0 X10E3/UL (ref 0–0.2)
BASOPHILS NFR BLD AUTO: 1 %
BUN SERPL-MCNC: 21 MG/DL (ref 8–27)
BUN/CREAT SERPL: 16 (ref 10–24)
CALCIUM SERPL-MCNC: 9.3 MG/DL (ref 8.6–10.2)
CHLORIDE SERPL-SCNC: 99 MMOL/L (ref 96–106)
CO2 SERPL-SCNC: 22 MMOL/L (ref 20–29)
CREAT SERPL-MCNC: 1.28 MG/DL (ref 0.76–1.27)
EGFR GENE MUT ANL BLD/T: 60 ML/MIN/1.73
EOSINOPHIL # BLD AUTO: 0.1 X10E3/UL (ref 0–0.4)
EOSINOPHIL NFR BLD AUTO: 3 %
ERYTHROCYTE [DISTWIDTH] IN BLOOD BY AUTOMATED COUNT: 12.6 % (ref 11.6–15.4)
GLUCOSE SERPL-MCNC: 107 MG/DL (ref 65–99)
HCT VFR BLD AUTO: 39.5 % (ref 37.5–51)
HGB BLD-MCNC: 13.7 G/DL (ref 13–17.7)
IMM GRANULOCYTES # BLD AUTO: 0 X10E3/UL (ref 0–0.1)
IMM GRANULOCYTES NFR BLD AUTO: 0 %
LYMPHOCYTES # BLD AUTO: 1.1 X10E3/UL (ref 0.7–3.1)
LYMPHOCYTES NFR BLD AUTO: 27 %
MCH RBC QN AUTO: 31.2 PG (ref 26.6–33)
MCHC RBC AUTO-ENTMCNC: 34.7 G/DL (ref 31.5–35.7)
MCV RBC AUTO: 90 FL (ref 79–97)
MONOCYTES # BLD AUTO: 0.3 X10E3/UL (ref 0.1–0.9)
MONOCYTES NFR BLD AUTO: 8 %
NEUTROPHILS # BLD AUTO: 2.4 X10E3/UL (ref 1.4–7)
NEUTROPHILS NFR BLD AUTO: 61 %
PLATELET # BLD AUTO: 149 X10E3/UL (ref 150–450)
POTASSIUM SERPL-SCNC: 4.2 MMOL/L (ref 3.5–5.2)
RBC # BLD AUTO: 4.39 X10E6/UL (ref 4.14–5.8)
SODIUM SERPL-SCNC: 138 MMOL/L (ref 134–144)
WBC # BLD AUTO: 3.9 X10E3/UL (ref 3.4–10.8)

## 2022-03-02 RX ORDER — AMLODIPINE BESYLATE 5 MG/1
5 TABLET ORAL DAILY
COMMUNITY
End: 2022-03-02 | Stop reason: SDUPTHER

## 2022-03-02 RX ORDER — AMLODIPINE BESYLATE 5 MG/1
5 TABLET ORAL DAILY
Qty: 90 TABLET | Refills: 1 | Status: SHIPPED | OUTPATIENT
Start: 2022-03-02 | End: 2022-09-07 | Stop reason: ALTCHOICE

## 2022-03-02 NOTE — TELEPHONE ENCOUNTER
----- Message from Douglas Collins MD sent at 3/2/2022 11:11 AM EST -----  His lab was steady, as the BP was high will try Amlodipine  5 mg take it at night time

## 2022-03-02 NOTE — TELEPHONE ENCOUNTER
Left a message with Ms. Marry Condon that Mr. Proctor lab work was good, but with his BP elevated would like to start her on Amlodipine 5mg and should take it at night incase of dizziness.  Please call us if any questions or concerns call  Us back.

## 2022-04-01 ENCOUNTER — OFFICE VISIT (OUTPATIENT)
Dept: CARDIOLOGY | Facility: CLINIC | Age: 72
End: 2022-04-01

## 2022-04-01 VITALS
WEIGHT: 174 LBS | SYSTOLIC BLOOD PRESSURE: 134 MMHG | TEMPERATURE: 97.1 F | HEIGHT: 72 IN | OXYGEN SATURATION: 97 % | RESPIRATION RATE: 15 BRPM | BODY MASS INDEX: 23.57 KG/M2 | HEART RATE: 67 BPM | DIASTOLIC BLOOD PRESSURE: 78 MMHG

## 2022-04-01 DIAGNOSIS — E78.00 HYPERCHOLESTEROLEMIA: ICD-10-CM

## 2022-04-01 DIAGNOSIS — I51.81 STRESS-INDUCED CARDIOMYOPATHY: Primary | ICD-10-CM

## 2022-04-01 DIAGNOSIS — I10 ESSENTIAL HYPERTENSION: ICD-10-CM

## 2022-04-01 PROCEDURE — 93000 ELECTROCARDIOGRAM COMPLETE: CPT | Performed by: INTERNAL MEDICINE

## 2022-04-01 PROCEDURE — 99214 OFFICE O/P EST MOD 30 MIN: CPT | Performed by: INTERNAL MEDICINE

## 2022-04-01 RX ORDER — VALSARTAN AND HYDROCHLOROTHIAZIDE 160; 12.5 MG/1; MG/1
1 TABLET, FILM COATED ORAL DAILY
COMMUNITY

## 2022-04-01 NOTE — PROGRESS NOTES
[facility] [place]  [facility] CARDIOLOGY  [address] [place] [address]  Dept: [phone]  Dept Fax: [phone]    Silviano Condon  1950    Follow Up Office Visit Note    History of Present Illness:  Silviano Condon is a 72 y.o. male who presents to the clinic for Follow-up.Stress cardiomyopathy - He seems doing well, his Ef recover to normal on Valsartan 320 mg and also Coreg 6,25 bid, he still complaints that Valsartan make him dizzy,, his BP is 130.80,     The following portions of the patient's history were reviewed and updated as appropriate: allergies, past family history, past medical history, past social history, past surgical history and problem list.    Medications:  albuterol sulfate HFA  amLODIPine  aspirin  carvedilol  cefdinir  DULERA IN  ondansetron  simvastatin  tamsulosin capsule  valsartan-hydrochlorothiazide    Subjective  Allergies   Allergen Reactions   • Ciprofloxacin Hives        Past Medical History:   Diagnosis Date   • CAD (coronary artery disease)    • Chronic osteoarthritis    • Colon cancer (HCC)    • COPD (chronic obstructive pulmonary disease) (HCC)    • Heart attack (HCC)     4-5 months ago   • Hx of colonic polyps    • Hyperlipidemia    • Hypertension    • Hypertension    • Ischemic cardiomyopathy    • Noncompliance    • Tobacco abuse     PREVIOUS       Past Surgical History:   Procedure Laterality Date   • COLON RESECTION      DR. AMAYA   • POLYPECTOMY         Family History   Problem Relation Age of Onset   • Diabetes Mother    • Hypertension Other         Social History     Socioeconomic History   • Marital status:    Tobacco Use   • Smoking status: Former Smoker     Packs/day: 1.00     Types: Cigarettes     Quit date:      Years since quittin.2   • Smokeless tobacco: Never Used   Vaping Use   • Vaping Use: Never used   Substance and Sexual Activity   • Alcohol use: Never   • Drug use: Never   • Sexual activity: Not  "Currently       Review of Systems   Constitutional: Negative.    HENT: Negative.    Respiratory: Negative.    Cardiovascular: Negative.    Endocrine: Negative.    Genitourinary: Negative.    Musculoskeletal: Negative.    Skin: Negative.    Allergic/Immunologic: Negative.    Neurological: Negative.    Hematological: Negative.    Psychiatric/Behavioral: Negative.        Cardiovascular Procedures    ECHO/MUGA:  STRESS TESTS:   CARDIAC CATH:   DEVICES:   HOLTER:   CT/MRI:   VASCULAR:   CARDIOTHORACIC:     Objective  Vitals:    04/01/22 1346   BP: 134/78   BP Location: Left arm   Patient Position: Sitting   Cuff Size: Adult   Pulse: 67   Resp: 15   Temp: 97.1 °F (36.2 °C)   TempSrc: Infrared   SpO2: 97%   Weight: 78.9 kg (174 lb)   Height: 182.9 cm (72\")   PainSc: 0-No pain     Body mass index is 23.6 kg/m².     Physical Exam  Constitutional:       Appearance: Healthy appearance. Not in distress.   Neck:      Vascular: No JVR. JVD normal.   Pulmonary:      Effort: Pulmonary effort is normal.      Breath sounds: Normal breath sounds. No wheezing. No rhonchi. No rales.   Chest:      Chest wall: Not tender to palpatation.   Cardiovascular:      PMI at left midclavicular line. Normal rate. Regular rhythm. Normal S1. Normal S2.      Murmurs: There is no murmur.      No gallop. No click. No rub.   Pulses:     Intact distal pulses.   Edema:     Peripheral edema absent.   Abdominal:      General: Bowel sounds are normal.      Palpations: Abdomen is soft.      Tenderness: There is no abdominal tenderness.   Musculoskeletal: Normal range of motion.         General: No tenderness. Skin:     General: Skin is warm and dry.   Neurological:      General: No focal deficit present.      Mental Status: Alert and oriented to person, place and time.          Diagnostic Data    ECG 12 Lead    Date/Time: 4/1/2022 7:30 PM  Performed by: Douglas Collins MD  Authorized by: Douglas Collins MD   Comparison: compared with previous ECG " from 6/3/2021  Similar to previous ECG  Rhythm: sinus rhythm  Rate: normal  BPM: 60  QRS axis: normal    Clinical impression: normal ECG            Assessment and Plan  Diagnoses and all orders for this visit:    Stress-induced cardiomyopathy- Better, no edema, no SOB on Valsartan and also Coreg                                                                                                                                                                                                                                                                                                                                                                        Essential hypertension,- BP is better after we added Amlodipine 5 mg, will keep with Coreg and also Valsartan    Hypercholesterolemia- On Simvastatin    Other orders  -     valsartan-hydrochlorothiazide (DIOVAN-HCT) 160-12.5 MG per tablet; Take 1 tablet by mouth Daily.         Return in about 6 months (around 10/1/2022) for Recheck.    Douglas Collins MD  04/01/2022

## 2022-08-15 RX ORDER — CARVEDILOL 6.25 MG/1
TABLET ORAL
Qty: 180 TABLET | Refills: 1 | Status: SHIPPED | OUTPATIENT
Start: 2022-08-15

## 2022-09-07 ENCOUNTER — OFFICE VISIT (OUTPATIENT)
Dept: CARDIOLOGY | Facility: CLINIC | Age: 72
End: 2022-09-07

## 2022-09-07 ENCOUNTER — TELEPHONE (OUTPATIENT)
Dept: CARDIOLOGY | Facility: CLINIC | Age: 72
End: 2022-09-07

## 2022-09-07 VITALS
OXYGEN SATURATION: 99 % | SYSTOLIC BLOOD PRESSURE: 152 MMHG | WEIGHT: 179 LBS | DIASTOLIC BLOOD PRESSURE: 82 MMHG | HEIGHT: 72 IN | HEART RATE: 67 BPM | BODY MASS INDEX: 24.24 KG/M2 | RESPIRATION RATE: 18 BRPM | TEMPERATURE: 97 F

## 2022-09-07 DIAGNOSIS — I10 ESSENTIAL HYPERTENSION: Primary | ICD-10-CM

## 2022-09-07 DIAGNOSIS — I51.81 STRESS-INDUCED CARDIOMYOPATHY: ICD-10-CM

## 2022-09-07 DIAGNOSIS — E78.00 HYPERCHOLESTEROLEMIA: ICD-10-CM

## 2022-09-07 PROCEDURE — 99214 OFFICE O/P EST MOD 30 MIN: CPT | Performed by: INTERNAL MEDICINE

## 2022-09-07 PROCEDURE — 93000 ELECTROCARDIOGRAM COMPLETE: CPT | Performed by: INTERNAL MEDICINE

## 2022-09-07 RX ORDER — MELOXICAM 15 MG/1
TABLET ORAL
COMMUNITY
Start: 2022-09-06

## 2022-09-07 NOTE — TELEPHONE ENCOUNTER
Caller: HONEY    Relationship to patient: Grandchild    Best call back number: [phone]    Patient is needing: PATIENT'S GRAND DAUGHTER WANTS TO MAKE SURE DOCTOR KNOWS ABOUT ALL OF HIS SYMPTOMS BEFORE HIS APPOINTMENT TODAY. HAD CHEST PAIN AND SHORTNESS OF BREATH, AND STOMACH PAINS THE LAST FEW DAYS. STATES PATIENT HAS ALSO BEEN USING HIS BREATHING MACHINE MORE OFTEN.

## 2022-09-07 NOTE — PROGRESS NOTES
[facility] [place]  [facility] CARDIOLOGY  [address] [place] [address]  Dept: [phone]  Dept Fax: [phone]    Silviano Condon  1950    Follow Up Office Visit Note    History of Present Illness:  Silviano Condon is a 72 y.o. male who presents to the clinic for  Stress cardiomyopathy- He denies any chest pain, SOB, has mild edema and mostly dizziness, he thinks the heart meds is causing it, his BP is 120.60 lying and  130.60 at 3 minutes standing, , will hold the Amlodipine, he will keep Coreg 6,25 bid and Valsartan 160 12,5    The following portions of the patient's history were reviewed and updated as appropriate: allergies, current medications, past family history, past medical history, past social history, past surgical history and problem list.    Medications:  albuterol sulfate HFA  amLODIPine  aspirin  carvedilol  cefdinir  DULERA IN  meloxicam  ondansetron  simvastatin  tamsulosin capsule  Trelegy Ellipta aerosol powder   valsartan-hydrochlorothiazide    Subjective  Allergies   Allergen Reactions   • Ciprofloxacin Hives        Past Medical History:   Diagnosis Date   • CAD (coronary artery disease)    • Chronic osteoarthritis    • Colon cancer (HCC)    • COPD (chronic obstructive pulmonary disease) (HCC)    • Heart attack (HCC)     4-5 months ago   • Hx of colonic polyps    • Hyperlipidemia    • Hypertension    • Hypertension    • Ischemic cardiomyopathy    • Noncompliance    • Tobacco abuse     PREVIOUS       Past Surgical History:   Procedure Laterality Date   • COLON RESECTION      DR. AMAYA   • POLYPECTOMY         Family History   Problem Relation Age of Onset   • Diabetes Mother    • Hypertension Other         Social History     Socioeconomic History   • Marital status:    Tobacco Use   • Smoking status: Former Smoker     Packs/day: 1.00     Types: Cigarettes     Quit date:      Years since quittin.6   • Smokeless tobacco: Never Used  "  Vaping Use   • Vaping Use: Never used   Substance and Sexual Activity   • Alcohol use: Never   • Drug use: Never   • Sexual activity: Not Currently       Review of Systems   Constitutional: Negative.    HENT: Negative.    Respiratory: Negative.    Cardiovascular: Negative.    Endocrine: Negative.    Genitourinary: Negative.    Musculoskeletal: Negative.    Skin: Negative.    Allergic/Immunologic: Negative.    Neurological: Negative.    Hematological: Negative.    Psychiatric/Behavioral: Negative.        Cardiovascular Procedures    ECHO/MUGA:   STRESS TESTS:   CARDIAC CATH:   DEVICES:   HOLTER:   CT/MRI:   VASCULAR:   CARDIOTHORACIC:     Objective  Vitals:    09/07/22 1446   BP: 152/82   BP Location: Left arm   Patient Position: Sitting   Cuff Size: Adult   Pulse: 67   Resp: 18   Temp: 97 °F (36.1 °C)   TempSrc: Infrared   SpO2: 99%   Weight: 81.2 kg (179 lb)   Height: 182.9 cm (72\")   PainSc: 0-No pain     Body mass index is 24.28 kg/m².     Physical Exam  Constitutional:       Appearance: Healthy appearance. Not in distress.   Neck:      Vascular: No JVR. JVD normal.   Pulmonary:      Effort: Pulmonary effort is normal.      Breath sounds: Normal breath sounds. No wheezing. No rhonchi. No rales.   Chest:      Chest wall: Not tender to palpatation.   Cardiovascular:      PMI at left midclavicular line. Normal rate. Regular rhythm. Normal S1. Normal S2.      Murmurs: There is no murmur.      No gallop. No click. No rub.   Pulses:     Intact distal pulses.   Edema:     Peripheral edema absent.   Abdominal:      General: Bowel sounds are normal.      Palpations: Abdomen is soft.      Tenderness: There is no abdominal tenderness.   Musculoskeletal: Normal range of motion.         General: No tenderness. Skin:     General: Skin is warm and dry.   Neurological:      General: No focal deficit present.      Mental Status: Alert and oriented to person, place and time.          Diagnostic Data    ECG 12 Lead    Date/Time: " 9/7/2022 3:04 PM  Performed by: Douglas Collins MD  Authorized by: Douglas Collins MD   Comparison: compared with previous ECG from 4/1/2022  Rhythm: sinus rhythm  Rate: normal  BPM: 69  QRS axis: normal    Clinical impression: normal ECG            Assessment and Plan  Diagnoses and all orders for this visit:    Essential hypertension- BP is 120.6-0 lying and 130.60 standing, , will d,c amlodipine, keep Coreg and Valsartan, HCTZ will get a BMP    Hypercholesterolemia- On Simvastatin    Stress-induced cardiomyopathy- With recovery of the EF, on Coreg 6,25 bid and also Valsartan 160.12,5    Other orders  -     Trelegy Ellipta 100-62.5-25 MCG/INH inhaler; Inhale 1 puff Daily.  -     meloxicam (MOBIC) 15 MG tablet         No follow-ups on file.    Douglas Collins MD  09/07/2022

## 2022-09-08 LAB
BUN SERPL-MCNC: 26 MG/DL (ref 8–27)
BUN/CREAT SERPL: 15 (ref 10–24)
CALCIUM SERPL-MCNC: 9.4 MG/DL (ref 8.6–10.2)
CHLORIDE SERPL-SCNC: 102 MMOL/L (ref 96–106)
CO2 SERPL-SCNC: 24 MMOL/L (ref 20–29)
CREAT SERPL-MCNC: 1.68 MG/DL (ref 0.76–1.27)
EGFRCR-CYS SERPLBLD CKD-EPI 2021: 43 ML/MIN/1.73
GLUCOSE SERPL-MCNC: 81 MG/DL (ref 65–99)
POTASSIUM SERPL-SCNC: 4.3 MMOL/L (ref 3.5–5.2)
SODIUM SERPL-SCNC: 140 MMOL/L (ref 134–144)

## 2022-09-09 ENCOUNTER — TELEPHONE (OUTPATIENT)
Dept: CARDIOLOGY | Facility: CLINIC | Age: 72
End: 2022-09-09

## 2022-09-09 NOTE — TELEPHONE ENCOUNTER
Left a message for  Taurus to call back and speak with Aniyah or dorothy to go over lab work.  Thank you.

## 2022-09-09 NOTE — TELEPHONE ENCOUNTER
----- Message from Douglas Collins MD sent at 9/8/2022 11:55 AM EDT -----  Creatinine is 1,6, we stop the Amlodipine yesterday , I hope the dizziness get better, I wanted to d,c the water pill but he was having some edema yesterday will watch,, repeat lab in 3 weeks

## 2022-09-14 NOTE — TELEPHONE ENCOUNTER
"Spoke with contact for Mr. Condon and she advised his wife has covid and so he probably does to and\"he is not feeling the best right now so hard to tell you how is dizziness is doing\".      I advised his creatinine, kidney function, was elevated at 1.6 so we stopped the Amlodipine he needs to come back last week of September once COVID symptoms are gone and we will recheck his BMP.  Dr. Collins wants to keep his water pill for now b/c he was having edema and we will monitor. She verbalized understanding.   "

## 2022-10-25 ENCOUNTER — TELEPHONE (OUTPATIENT)
Dept: FAMILY MEDICINE CLINIC | Facility: CLINIC | Age: 72
End: 2022-10-25

## 2022-10-25 ENCOUNTER — OFFICE VISIT (OUTPATIENT)
Dept: FAMILY MEDICINE CLINIC | Facility: CLINIC | Age: 72
End: 2022-10-25

## 2022-10-25 VITALS
BODY MASS INDEX: 22.62 KG/M2 | SYSTOLIC BLOOD PRESSURE: 110 MMHG | HEIGHT: 72 IN | DIASTOLIC BLOOD PRESSURE: 72 MMHG | OXYGEN SATURATION: 95 % | HEART RATE: 75 BPM | WEIGHT: 167 LBS

## 2022-10-25 DIAGNOSIS — E78.2 HYPERLIPIDEMIA, MIXED: ICD-10-CM

## 2022-10-25 DIAGNOSIS — G89.29 CHRONIC PAIN OF MULTIPLE JOINTS: ICD-10-CM

## 2022-10-25 DIAGNOSIS — I10 HYPERTENSION, ESSENTIAL: Primary | ICD-10-CM

## 2022-10-25 DIAGNOSIS — J44.9 CHRONIC OBSTRUCTIVE PULMONARY DISEASE, UNSPECIFIED COPD TYPE: ICD-10-CM

## 2022-10-25 DIAGNOSIS — M25.50 CHRONIC PAIN OF MULTIPLE JOINTS: ICD-10-CM

## 2022-10-25 DIAGNOSIS — Z12.5 PROSTATE CANCER SCREENING: ICD-10-CM

## 2022-10-25 DIAGNOSIS — F33.0 MAJOR DEPRESSIVE DISORDER, RECURRENT, MILD: ICD-10-CM

## 2022-10-25 PROCEDURE — 36415 COLL VENOUS BLD VENIPUNCTURE: CPT | Performed by: PHYSICIAN ASSISTANT

## 2022-10-25 PROCEDURE — 90662 IIV NO PRSV INCREASED AG IM: CPT | Performed by: PHYSICIAN ASSISTANT

## 2022-10-25 PROCEDURE — G0008 ADMIN INFLUENZA VIRUS VAC: HCPCS | Performed by: PHYSICIAN ASSISTANT

## 2022-10-25 PROCEDURE — 99204 OFFICE O/P NEW MOD 45 MIN: CPT | Performed by: PHYSICIAN ASSISTANT

## 2022-10-25 NOTE — TELEPHONE ENCOUNTER
Caller: FIFI BOSCH    Relationship: Emergency Contact    Best call back number: [phone]    What is the medical concern/diagnosis: DIFFICULTY BREATHING     What specialty or service is being requested: PULMONOLOGIST     What is the provider, practice or medical service name: [facility] IN [place]      Any additional details: THE CALLER STATES THAT THE THE PATIENT WAS SEEN TODAY 10/25/2022 AND THE DOCTOR DISCUSSED SENDING THE PATIENT TO DOCTOR [facility]TE[facility] THE CALLER CONTACTED DOCTOR [facility] AND WAS TOLD THAT THE PATIENT WILL NEED A REFERRAL PLEASE CALL THE CALLER TO LET HER KNOW IF THAT CAN BE DONE

## 2022-10-25 NOTE — PROGRESS NOTES
"Chief Complaint  establish care and med refills    Subjective          Silviano Condon presents to [facility] PRIMARY CARE  History of Present Illness  Patient in today to establish care and for medication refills. He ran out of Trelegy about a month ago so using his albuterol often. He is interested in following up with a pulmonologist as continues to have shortness of breath even when on medication. He is utd on cardiology evaluation. He is here fasting for labs. He states bp has been doing well. He denies any chest pain or shortness of breath. He feels may have some mild depression symptoms and may want to consider medication. Denies any SI/HI. Takes mobic for general joint pains. He states was supposed to f/up with gen surgery after his colon surgery following colon cancer diagnosis but has not done yet. He states will call them to get back in for evaluation.   Hypertension  This is a chronic problem. Associated symptoms include shortness of breath. Pertinent negatives include no blurred vision, chest pain, headaches, malaise/fatigue, palpitations or peripheral edema.   Hyperlipidemia  This is a chronic problem. Associated symptoms include shortness of breath. Pertinent negatives include no chest pain.   Depression  Visit Type: initial  Progression since onset: gradually worsening  Patient presents with the following symptoms: depressed mood and shortness of breath.  Patient is not experiencing: chest pain, irritability, nervousness/anxiety, palpitations, panic, suicidal ideas, suicidal planning and thoughts of death.        Objective   Vital Signs:   /72 (BP Location: Left arm, Patient Position: Sitting, Cuff Size: Large Adult)   Pulse 75   Ht 182.9 cm (72\")   Wt 75.8 kg (167 lb)   SpO2 95%   BMI 22.65 kg/m²     Body mass index is 22.65 kg/m².    Review of Systems   Constitutional: Negative for fever, irritability and malaise/fatigue.   HENT: Negative for congestion and sore throat. "    Eyes: Negative for blurred vision.   Respiratory: Positive for cough and shortness of breath. Negative for wheezing.    Cardiovascular: Negative for chest pain, palpitations and leg swelling.   Gastrointestinal: Negative for abdominal pain, blood in stool, diarrhea, nausea and vomiting.   Genitourinary: Negative for dysuria and frequency.   Musculoskeletal: Positive for arthralgias.   Neurological: Negative for dizziness and headache.   Psychiatric/Behavioral: Positive for depressed mood. Negative for suicidal ideas. The patient is not nervous/anxious.        Past History:  Medical History: has a past medical history of CAD (coronary artery disease), Chronic osteoarthritis, Colon cancer (HCC), COPD (chronic obstructive pulmonary disease) (HCC), Heart attack (HCC), colonic polyps, Hyperlipidemia, Hypertension, Hypertension, Ischemic cardiomyopathy, Noncompliance, and Tobacco abuse.   Surgical History: has a past surgical history that includes Polypectomy and Colectomy (2014).   Family History: family history includes Diabetes in his mother; Hypertension in an other family member.   Social History: reports that he quit smoking about 25 years ago. His smoking use included cigarettes. He smoked an average of 1 pack per day. He has never used smokeless tobacco. He reports that he does not drink alcohol and does not use drugs.      Current Outpatient Medications:   •  albuterol sulfate  (90 Base) MCG/ACT inhaler, Inhale 2 puffs Every 4 (Four) Hours As Needed for Wheezing., Disp: , Rfl:   •  aspirin 81 MG chewable tablet, Chew 1 tablet 2 (Two) Times a Day., Disp: , Rfl:   •  carvedilol (COREG) 6.25 MG tablet, TAKE 1 TABLET BY MOUTH TWICE DAILY WITH MEALS, Disp: 180 tablet, Rfl: 1  •  meloxicam (MOBIC) 15 MG tablet, , Disp: , Rfl:   •  simvastatin (ZOCOR) 40 MG tablet, Take 40 mg by mouth Every Night., Disp: , Rfl:   •  tamsulosin (FLOMAX) 0.4 MG capsule 24 hr capsule, Take 1 capsule by mouth Daily., Disp: , Rfl:    •  Trelegy Ellipta 100-62.5-25 MCG/INH inhaler, Inhale 1 puff Daily., Disp: , Rfl:   •  valsartan-hydrochlorothiazide (DIOVAN-HCT) 160-12.5 MG per tablet, Take 1 tablet by mouth Daily., Disp: , Rfl:   Allergies: Ciprofloxacin    Physical Exam  Constitutional:       Appearance: Normal appearance.   HENT:      Right Ear: Tympanic membrane normal.      Left Ear: Tympanic membrane normal.      Mouth/Throat:      Pharynx: Oropharynx is clear.   Eyes:      Conjunctiva/sclera: Conjunctivae normal.      Pupils: Pupils are equal, round, and reactive to light.   Cardiovascular:      Rate and Rhythm: Normal rate and regular rhythm.      Heart sounds: Normal heart sounds.   Pulmonary:      Effort: Pulmonary effort is normal.      Breath sounds: Normal breath sounds.   Abdominal:      Palpations: Abdomen is soft.      Tenderness: There is no abdominal tenderness.   Neurological:      Mental Status: He is oriented to person, place, and time.   Psychiatric:         Mood and Affect: Mood normal.         Behavior: Behavior normal.             Assessment and Plan   Diagnoses and all orders for this visit:    1. Hypertension, essential (Primary)  -     CBC & Differential; Future  -     Comprehensive Metabolic Panel; Future  -     TSH; Future  -     CBC & Differential  -     Comprehensive Metabolic Panel  -     TSH  Refilled current medications. Fasting labs today. Encouraged healthy diet and exercise. Patient to f/up with gen surgery regarding colon f/up.   2. Hyperlipidemia, mixed  -     Lipid Panel; Future  -     Lipid Panel  Refilled simvastatin.   3. Prostate cancer screening  -     PSA Screen; Future  -     PSA Screen  psa with labs.   4. Chronic obstructive pulmonary disease, unspecified COPD type (HCC)  Samples given of Trelegy until can get in with pulmonology for further evaluation. Discussed other immunizations. RTC prior to recheck as needed.   6. Major depressive disorder, recurrent, mild  Patient states may want to  consider medication if labs are in normal range and then will f/up accordingly. Offered counseling, he declines at this time.   Other orders  -     Fluzone High-Dose 65+yrs            Follow Up   No follow-ups on file.  Patient was given instructions and counseling regarding his condition or for health maintenance advice. Please see specific information pulled into the AVS if appropriate.     Minerva Dickerson PA-C

## 2022-10-26 DIAGNOSIS — J44.9 CHRONIC OBSTRUCTIVE PULMONARY DISEASE, UNSPECIFIED COPD TYPE: Primary | ICD-10-CM

## 2022-10-26 LAB
ALBUMIN SERPL-MCNC: 4.5 G/DL (ref 3.7–4.7)
ALBUMIN/GLOB SERPL: 1.7 {RATIO} (ref 1.2–2.2)
ALP SERPL-CCNC: 85 IU/L (ref 44–121)
ALT SERPL-CCNC: 11 IU/L (ref 0–44)
AST SERPL-CCNC: 16 IU/L (ref 0–40)
BASOPHILS # BLD AUTO: 0 X10E3/UL (ref 0–0.2)
BASOPHILS NFR BLD AUTO: 1 %
BILIRUB SERPL-MCNC: 0.7 MG/DL (ref 0–1.2)
BUN SERPL-MCNC: 27 MG/DL (ref 8–27)
BUN/CREAT SERPL: 12 (ref 10–24)
CALCIUM SERPL-MCNC: 9.3 MG/DL (ref 8.6–10.2)
CHLORIDE SERPL-SCNC: 101 MMOL/L (ref 96–106)
CHOLEST SERPL-MCNC: 141 MG/DL (ref 100–199)
CO2 SERPL-SCNC: 22 MMOL/L (ref 20–29)
CREAT SERPL-MCNC: 2.33 MG/DL (ref 0.76–1.27)
EGFRCR SERPLBLD CKD-EPI 2021: 29 ML/MIN/1.73
EOSINOPHIL # BLD AUTO: 0.2 X10E3/UL (ref 0–0.4)
EOSINOPHIL NFR BLD AUTO: 4 %
ERYTHROCYTE [DISTWIDTH] IN BLOOD BY AUTOMATED COUNT: 12.6 % (ref 11.6–15.4)
GLOBULIN SER CALC-MCNC: 2.7 G/DL (ref 1.5–4.5)
GLUCOSE SERPL-MCNC: 107 MG/DL (ref 70–99)
HCT VFR BLD AUTO: 40 % (ref 37.5–51)
HDLC SERPL-MCNC: 39 MG/DL
HGB BLD-MCNC: 13.1 G/DL (ref 13–17.7)
IMM GRANULOCYTES # BLD AUTO: 0 X10E3/UL (ref 0–0.1)
IMM GRANULOCYTES NFR BLD AUTO: 0 %
LDLC SERPL CALC-MCNC: 80 MG/DL (ref 0–99)
LYMPHOCYTES # BLD AUTO: 1.1 X10E3/UL (ref 0.7–3.1)
LYMPHOCYTES NFR BLD AUTO: 21 %
MCH RBC QN AUTO: 30.1 PG (ref 26.6–33)
MCHC RBC AUTO-ENTMCNC: 32.8 G/DL (ref 31.5–35.7)
MCV RBC AUTO: 92 FL (ref 79–97)
MONOCYTES # BLD AUTO: 0.5 X10E3/UL (ref 0.1–0.9)
MONOCYTES NFR BLD AUTO: 9 %
NEUTROPHILS # BLD AUTO: 3.6 X10E3/UL (ref 1.4–7)
NEUTROPHILS NFR BLD AUTO: 65 %
PLATELET # BLD AUTO: 171 X10E3/UL (ref 150–450)
POTASSIUM SERPL-SCNC: 4.3 MMOL/L (ref 3.5–5.2)
PROT SERPL-MCNC: 7.2 G/DL (ref 6–8.5)
PSA SERPL-MCNC: 0.9 NG/ML (ref 0–4)
RBC # BLD AUTO: 4.35 X10E6/UL (ref 4.14–5.8)
SODIUM SERPL-SCNC: 140 MMOL/L (ref 134–144)
TRIGL SERPL-MCNC: 122 MG/DL (ref 0–149)
TSH SERPL DL<=0.005 MIU/L-ACNC: 1.16 UIU/ML (ref 0.45–4.5)
VLDLC SERPL CALC-MCNC: 22 MG/DL (ref 5–40)
WBC # BLD AUTO: 5.4 X10E3/UL (ref 3.4–10.8)

## 2022-12-01 ENCOUNTER — LAB (OUTPATIENT)
Dept: FAMILY MEDICINE CLINIC | Facility: CLINIC | Age: 72
End: 2022-12-01

## 2022-12-01 DIAGNOSIS — Z12.5 SPECIAL SCREENING FOR MALIGNANT NEOPLASM OF PROSTATE: ICD-10-CM

## 2022-12-01 DIAGNOSIS — I10 ESSENTIAL HYPERTENSION, MALIGNANT: Primary | ICD-10-CM

## 2022-12-01 PROCEDURE — 36415 COLL VENOUS BLD VENIPUNCTURE: CPT | Performed by: PHYSICIAN ASSISTANT

## 2022-12-02 LAB
ALBUMIN SERPL-MCNC: 4.6 G/DL (ref 3.7–4.7)
ALBUMIN/GLOB SERPL: 1.6 {RATIO} (ref 1.2–2.2)
ALP SERPL-CCNC: 82 IU/L (ref 44–121)
ALT SERPL-CCNC: 11 IU/L (ref 0–44)
AST SERPL-CCNC: 15 IU/L (ref 0–40)
BILIRUB SERPL-MCNC: 0.5 MG/DL (ref 0–1.2)
BUN SERPL-MCNC: 19 MG/DL (ref 8–27)
BUN/CREAT SERPL: 15 (ref 10–24)
CALCIUM SERPL-MCNC: 9.8 MG/DL (ref 8.6–10.2)
CHLORIDE SERPL-SCNC: 103 MMOL/L (ref 96–106)
CHOLEST SERPL-MCNC: 258 MG/DL (ref 100–199)
CO2 SERPL-SCNC: 23 MMOL/L (ref 20–29)
CREAT SERPL-MCNC: 1.28 MG/DL (ref 0.76–1.27)
EGFRCR SERPLBLD CKD-EPI 2021: 60 ML/MIN/1.73
ERYTHROCYTE [DISTWIDTH] IN BLOOD BY AUTOMATED COUNT: 12.3 % (ref 11.6–15.4)
GLOBULIN SER CALC-MCNC: 2.8 G/DL (ref 1.5–4.5)
GLUCOSE SERPL-MCNC: 119 MG/DL (ref 70–99)
HCT VFR BLD AUTO: 40.1 % (ref 37.5–51)
HDLC SERPL-MCNC: 51 MG/DL
HGB BLD-MCNC: 13.4 G/DL (ref 13–17.7)
LDLC SERPL CALC-MCNC: 180 MG/DL (ref 0–99)
MCH RBC QN AUTO: 30.5 PG (ref 26.6–33)
MCHC RBC AUTO-ENTMCNC: 33.4 G/DL (ref 31.5–35.7)
MCV RBC AUTO: 91 FL (ref 79–97)
PLATELET # BLD AUTO: 160 X10E3/UL (ref 150–450)
POTASSIUM SERPL-SCNC: 4.4 MMOL/L (ref 3.5–5.2)
PROT SERPL-MCNC: 7.4 G/DL (ref 6–8.5)
PSA SERPL-MCNC: 0.6 NG/ML (ref 0–4)
RBC # BLD AUTO: 4.39 X10E6/UL (ref 4.14–5.8)
SODIUM SERPL-SCNC: 140 MMOL/L (ref 134–144)
TRIGL SERPL-MCNC: 149 MG/DL (ref 0–149)
VLDLC SERPL CALC-MCNC: 27 MG/DL (ref 5–40)
WBC # BLD AUTO: 5.1 X10E3/UL (ref 3.4–10.8)

## 2023-03-13 RX ORDER — TAMSULOSIN HYDROCHLORIDE 0.4 MG/1
1 CAPSULE ORAL DAILY
Qty: 30 CAPSULE | OUTPATIENT
Start: 2023-03-13

## 2023-03-13 NOTE — TELEPHONE ENCOUNTER
Caller: ANNA    Relationship: Spouse    Best call back number: [phone]    Requested Prescriptions:   Requested Prescriptions     Pending Prescriptions Disp Refills   • tamsulosin (FLOMAX) 0.4 MG capsule 24 hr capsule 30 capsule      Sig: Take 1 capsule by mouth Daily.        Pharmacy where request should be sent:  [facility] [address] - [phone] [facility] [phone] [facility]        Additional details provided by patient: PATIENT HAS BEEN OUT OF MEDICATION FOR A FEW DAYS    Does the patient have less than a 3 day supply:  [x] Yes  [] No    Would you like a call back once the refill request has been completed: [] Yes [] No    If the office needs to give you a call back, can they leave a voicemail: [] Yes [] No    Bernabe Mcdaniels Rep   03/13/23 14:05 EDT

## 2023-03-15 NOTE — TELEPHONE ENCOUNTER
"LVM for granddaughter to call back   HUB TO READ  \"We are not prescribing this medication- maybe it is from urology? Thanks. \"  "

## 2023-03-17 RX ORDER — TAMSULOSIN HYDROCHLORIDE 0.4 MG/1
1 CAPSULE ORAL DAILY
Qty: 30 CAPSULE | Refills: 0 | Status: SHIPPED | OUTPATIENT
Start: 2023-03-17 | End: 2023-04-06

## 2023-03-21 ENCOUNTER — TELEPHONE (OUTPATIENT)
Dept: CARDIOLOGY | Facility: CLINIC | Age: 73
End: 2023-03-21

## 2023-03-21 NOTE — TELEPHONE ENCOUNTER
Caller: HONEY MONTOYA     Relationship: [unfilled]     Best call back number: [phone]    What is your medical concern? SOB, POUNDING IN HIS CHEST, AND HEADACHE. REPORTS HE IS ALSO EXTREMELY SICK.     How long has this issue been going on? HAS JUST RECENTLY BEEN BROUGHT UP TO HIS GRANDDAUGHTER     Is your provider already aware of this issue? YES    Have you been treated for this issue? CARVEDILOL,   TAMSULOSIN, VALSARTAN, TRELEGY INHALER, SIMVASTATIN.

## 2023-03-22 NOTE — TELEPHONE ENCOUNTER
Spoke with Humberto Chaparro, granddaughter on contact list, and advised her that we received a message from a Uri Zaidi at closing time last night that MR. Condon was having SOB, Chest pounding, headache, and extremely sick.  I tried calling back this morning but no answer or way to leave a message and tried Mr. Proctor number but it is no longer in service.  I explained with those symptoms we would really advise that he go to the ER.  Ms. Chaparro agreed these symptoms are concerning and she is going to call uri, her sister, and try to find out what is going on.  She advised she would let us know what is going on.  I again expressed he needs to go to the ER. She verbalized understanding.

## 2023-04-03 ENCOUNTER — TELEPHONE (OUTPATIENT)
Dept: FAMILY MEDICINE CLINIC | Facility: CLINIC | Age: 73
End: 2023-04-03
Payer: MEDICARE

## 2023-04-03 NOTE — TELEPHONE ENCOUNTER
Caller: ADEBAYO OVIEDO    Relationship to patient: Emergency Contact    Best call back number: [phone]    Chief complaint: SHORTNESS OF BREATH AT REST    Patient directed to call 911 or go to their nearest emergency room.     Patient verbalized understanding: [] Yes  [x] No  If no, why?    Additional notes:     GRANDDAUGHTER ADVISED THAT PATIENT WILL NOT GO TO ER FOR SYMPTOMS.     HUB UNABLE TO WARM TRANSFER TO [facility]    HUB UNABLE TO WARM TRANSFER TO [facility]

## 2023-04-04 NOTE — TELEPHONE ENCOUNTER
Patient apparently has an appointment tomorrow with you, but this is the message that was sent to the pool. ALYSSIA

## 2023-04-05 ENCOUNTER — TELEPHONE (OUTPATIENT)
Dept: FAMILY MEDICINE CLINIC | Facility: CLINIC | Age: 73
End: 2023-04-05
Payer: MEDICARE

## 2023-04-05 NOTE — TELEPHONE ENCOUNTER
Patients phone number listed is not in service. I called the granddaughter carol and LVM for her to call the office so that we can get him re-leopoldo today with another provider to make sure he is seen in a timely manner for the shortness of breath when sitting. LVM for her to call back and ask for Avis.

## 2023-04-06 ENCOUNTER — OFFICE VISIT (OUTPATIENT)
Dept: FAMILY MEDICINE CLINIC | Facility: CLINIC | Age: 73
End: 2023-04-06
Payer: MEDICARE

## 2023-04-06 VITALS
WEIGHT: 169 LBS | HEIGHT: 72 IN | HEART RATE: 54 BPM | OXYGEN SATURATION: 100 % | SYSTOLIC BLOOD PRESSURE: 154 MMHG | BODY MASS INDEX: 22.89 KG/M2 | DIASTOLIC BLOOD PRESSURE: 90 MMHG

## 2023-04-06 DIAGNOSIS — J44.1 COPD WITH EXACERBATION: Primary | ICD-10-CM

## 2023-04-06 PROCEDURE — 3077F SYST BP >= 140 MM HG: CPT | Performed by: STUDENT IN AN ORGANIZED HEALTH CARE EDUCATION/TRAINING PROGRAM

## 2023-04-06 PROCEDURE — 3080F DIAST BP >= 90 MM HG: CPT | Performed by: STUDENT IN AN ORGANIZED HEALTH CARE EDUCATION/TRAINING PROGRAM

## 2023-04-06 PROCEDURE — 99214 OFFICE O/P EST MOD 30 MIN: CPT | Performed by: STUDENT IN AN ORGANIZED HEALTH CARE EDUCATION/TRAINING PROGRAM

## 2023-04-06 RX ORDER — AZITHROMYCIN 250 MG/1
TABLET, FILM COATED ORAL
Qty: 6 TABLET | Refills: 0 | Status: SHIPPED | OUTPATIENT
Start: 2023-04-06

## 2023-04-06 RX ORDER — PREDNISONE 10 MG/1
40 TABLET ORAL DAILY
Qty: 20 TABLET | Refills: 0 | Status: SHIPPED | OUTPATIENT
Start: 2023-04-06 | End: 2023-04-11

## 2023-04-06 NOTE — PROGRESS NOTES
"Chief Complaint  Shortness of Breath    Subjective          Silviano Condon presents to [facility]  URI   This is a new problem. The current episode started in the past 7 days. The problem has been gradually worsening. There has been no fever. Associated symptoms include congestion, coughing, headaches, sinus pain, sneezing and wheezing. Associated symptoms comments: MCDUFFIE. He has tried inhaler use for the symptoms. The treatment provided mild relief.   Patient with known COPD, he has been off of his trelegy 2/2 cost. No known exposure to COVID 19.         Objective   Vital Signs:   /90 (BP Location: Right arm, Patient Position: Sitting, Cuff Size: Adult)   Pulse 54   Ht 182.9 cm (72\")   Wt 76.7 kg (169 lb)   SpO2 100%   BMI 22.92 kg/m²     Body mass index is 22.92 kg/m².    Review of Systems   HENT: Positive for congestion and sneezing.    Respiratory: Positive for cough and wheezing.        Past History:  Medical History: has a past medical history of CAD (coronary artery disease), Chronic osteoarthritis, Colon cancer, COPD (chronic obstructive pulmonary disease), Heart attack, colonic polyps, Hyperlipidemia, Hypertension, Hypertension, Ischemic cardiomyopathy, Noncompliance, and Tobacco abuse.   Surgical History: has a past surgical history that includes Polypectomy and Colectomy (2014).   Family History: family history includes Diabetes in his mother; Hypertension in an other family member.   Social History: reports that he quit smoking about 26 years ago. His smoking use included cigarettes. He smoked an average of 1 pack per day. He has never used smokeless tobacco. He reports that he does not drink alcohol and does not use drugs.      Current Outpatient Medications:   •  albuterol sulfate  (90 Base) MCG/ACT inhaler, Inhale 2 puffs Every 4 (Four) Hours As Needed for Wheezing., Disp: , Rfl:   •  aspirin 81 MG chewable tablet, Chew 1 tablet 2 (Two) Times a Day., " Disp: , Rfl:   •  azithromycin (Zithromax Z-Jose) 250 MG tablet, Take 2 tablets by mouth on day 1, then 1 tablet daily on days 2-5, Disp: 6 tablet, Rfl: 0  •  carvedilol (COREG) 6.25 MG tablet, TAKE 1 TABLET BY MOUTH TWICE DAILY WITH MEALS, Disp: 180 tablet, Rfl: 1  •  meloxicam (MOBIC) 15 MG tablet, , Disp: , Rfl:   •  predniSONE (DELTASONE) 10 MG tablet, Take 4 tablets by mouth Daily for 5 days., Disp: 20 tablet, Rfl: 0  •  simvastatin (ZOCOR) 40 MG tablet, Take 40 mg by mouth Every Night., Disp: , Rfl:   •  Trelegy Ellipta 100-62.5-25 MCG/INH inhaler, Inhale 1 puff Daily., Disp: , Rfl:   •  valsartan-hydrochlorothiazide (DIOVAN-HCT) 160-12.5 MG per tablet, Take 1 tablet by mouth Daily., Disp: , Rfl:     Allergies: Ciprofloxacin    Physical Exam  Constitutional:       General: He is not in acute distress.     Appearance: He is not ill-appearing or toxic-appearing.   HENT:      Head: Normocephalic and atraumatic.   Cardiovascular:      Rate and Rhythm: Normal rate and regular rhythm.      Heart sounds: No murmur heard.  Pulmonary:      Effort: Pulmonary effort is normal.      Breath sounds: Wheezing present. No rhonchi.      Comments: Barrel chest  Neurological:      General: No focal deficit present.      Mental Status: He is alert and oriented to person, place, and time.   Psychiatric:         Mood and Affect: Mood normal.         Thought Content: Thought content normal.          Result Review :                   Assessment and Plan    Diagnoses and all orders for this visit:    1. COPD with exacerbation (Primary)    Other orders  -     predniSONE (DELTASONE) 10 MG tablet; Take 4 tablets by mouth Daily for 5 days.  Dispense: 20 tablet; Refill: 0  -     azithromycin (Zithromax Z-Jose) 250 MG tablet; Take 2 tablets by mouth on day 1, then 1 tablet daily on days 2-5  Dispense: 6 tablet; Refill: 0    Patient with known COPD, which is in acute exacerbation. Will send in Prednisone burst and azithromycin 250mg X5 days.  Patient advised to continue current inhalers for maintenance, increase albuterol or nebulizer to [id number] PRN for shortness of breath and wheeze. Call with new or worsening symptoms including fever, chills, productive sputum or increased work of breathing.      Follow Up   No follow-ups on file.  Patient was given instructions and counseling regarding his condition or for health maintenance advice. Please see specific information pulled into the AVS if appropriate.     Marsha Bryant, DO

## 2023-04-25 ENCOUNTER — TELEPHONE (OUTPATIENT)
Dept: FAMILY MEDICINE CLINIC | Facility: CLINIC | Age: 73
End: 2023-04-25
Payer: MEDICARE

## 2023-04-25 NOTE — TELEPHONE ENCOUNTER
Patient is requesting a refill for valsartan-hydrochlorothiazide.  [facility] was able to give him a three day supply but he is completely out again.    He is scheduled for a Med Refill on 5/1 (not sure if he needs this appt or not, but wanted to be on the safe side) last appt was a new patient visit on 10/25 renetta/Mady.

## 2023-04-26 RX ORDER — VALSARTAN AND HYDROCHLOROTHIAZIDE 160; 12.5 MG/1; MG/1
1 TABLET, FILM COATED ORAL DAILY
Qty: 30 TABLET | Refills: 0 | Status: SHIPPED | OUTPATIENT
Start: 2023-04-26

## 2023-05-01 ENCOUNTER — OFFICE VISIT (OUTPATIENT)
Dept: FAMILY MEDICINE CLINIC | Facility: CLINIC | Age: 73
End: 2023-05-01
Payer: MEDICARE

## 2023-05-01 VITALS
BODY MASS INDEX: 23.3 KG/M2 | DIASTOLIC BLOOD PRESSURE: 80 MMHG | TEMPERATURE: 97.7 F | HEIGHT: 72 IN | HEART RATE: 70 BPM | SYSTOLIC BLOOD PRESSURE: 142 MMHG | OXYGEN SATURATION: 96 % | WEIGHT: 172 LBS

## 2023-05-01 DIAGNOSIS — E78.2 HYPERLIPIDEMIA, MIXED: ICD-10-CM

## 2023-05-01 DIAGNOSIS — I10 HYPERTENSION, ESSENTIAL: Primary | ICD-10-CM

## 2023-05-01 DIAGNOSIS — J44.9 CHRONIC OBSTRUCTIVE PULMONARY DISEASE, UNSPECIFIED COPD TYPE: ICD-10-CM

## 2023-05-01 PROCEDURE — 3079F DIAST BP 80-89 MM HG: CPT | Performed by: PHYSICIAN ASSISTANT

## 2023-05-01 PROCEDURE — 3077F SYST BP >= 140 MM HG: CPT | Performed by: PHYSICIAN ASSISTANT

## 2023-05-01 PROCEDURE — 99214 OFFICE O/P EST MOD 30 MIN: CPT | Performed by: PHYSICIAN ASSISTANT

## 2023-05-01 RX ORDER — SIMVASTATIN 40 MG
40 TABLET ORAL NIGHTLY
Qty: 90 TABLET | Refills: 0 | Status: SHIPPED | OUTPATIENT
Start: 2023-05-01 | End: 2023-05-03

## 2023-05-01 NOTE — PROGRESS NOTES
"Chief Complaint  Hypertension, Hyperlipidemia, Med Refill (Pt is here for a medication recheck today. He is out of some of medications. ), and COPD    Subjective          Silviano Condon presents to [facility]  History of Present Illness  Patient in today to get refill on bp medication and statin- states has been out of statin past few months. He was unaware he had cardiology appt a few months ago and missed appt so needing to get rescheduled. Also states he was not notified of appt with pulmonology- referral sent in 10/2022. He has COPD and wanting to get in for consult.   Hypertension  This is a chronic problem. Pertinent negatives include no blurred vision, chest pain, palpitations, peripheral edema or shortness of breath.   Hyperlipidemia  This is a chronic problem. Pertinent negatives include no chest pain or shortness of breath. Current antihyperlipidemic treatment includes statins.   COPD  He complains of cough and hoarse voice. There is no hemoptysis, shortness of breath or wheezing. Pertinent negatives include no chest pain, fever or sore throat.       Objective   Vital Signs:   /80   Pulse 70   Temp 97.7 °F (36.5 °C)   Ht 182.9 cm (72\")   Wt 78 kg (172 lb)   SpO2 96%   BMI 23.33 kg/m²     Body mass index is 23.33 kg/m².    Review of Systems   Constitutional: Negative for fever.   HENT: Positive for hoarse voice. Negative for congestion and sore throat.    Eyes: Negative for blurred vision.   Respiratory: Positive for cough. Negative for hemoptysis, shortness of breath and wheezing.    Cardiovascular: Negative for chest pain and palpitations.   Gastrointestinal: Negative for abdominal pain, diarrhea, nausea and vomiting.   Genitourinary: Negative for dysuria and frequency.   Neurological: Negative for dizziness and headache.       Past History:  Medical History: has a past medical history of CAD (coronary artery disease), Chronic osteoarthritis, Colon cancer, COPD " (chronic obstructive pulmonary disease), Heart attack, colonic polyps, Hyperlipidemia, Hypertension, Hypertension, Ischemic cardiomyopathy, Noncompliance, and Tobacco abuse.   Surgical History: has a past surgical history that includes Polypectomy and Colectomy (2014).   Family History: family history includes Diabetes in his mother; Hypertension in an other family member.   Social History: reports that he quit smoking about 26 years ago. His smoking use included cigarettes. He started smoking about 52 years ago. He has a 26.00 pack-year smoking history. He has never used smokeless tobacco. He reports that he does not drink alcohol and does not use drugs.      Current Outpatient Medications:   •  albuterol sulfate  (90 Base) MCG/ACT inhaler, Inhale 2 puffs Every 4 (Four) Hours As Needed for Wheezing., Disp: , Rfl:   •  aspirin 81 MG chewable tablet, Chew 1 tablet 2 (Two) Times a Day., Disp: , Rfl:   •  carvedilol (COREG) 6.25 MG tablet, TAKE 1 TABLET BY MOUTH TWICE DAILY WITH MEALS, Disp: 180 tablet, Rfl: 1  •  simvastatin (ZOCOR) 40 MG tablet, Take 1 tablet by mouth Every Night., Disp: 90 tablet, Rfl: 0  •  Trelegy Ellipta 100-62.5-25 MCG/INH inhaler, Inhale 1 puff Daily., Disp: , Rfl:   •  valsartan-hydrochlorothiazide (DIOVAN-HCT) 160-12.5 MG per tablet, Take 1 tablet by mouth Daily. (Patient not taking: Reported on 5/1/2023), Disp: 30 tablet, Rfl: 0  Allergies: Ciprofloxacin    Physical Exam  Constitutional:       Appearance: Normal appearance.   HENT:      Right Ear: Tympanic membrane normal.      Left Ear: Tympanic membrane normal.      Mouth/Throat:      Pharynx: Oropharynx is clear.   Eyes:      Conjunctiva/sclera: Conjunctivae normal.      Pupils: Pupils are equal, round, and reactive to light.   Cardiovascular:      Rate and Rhythm: Normal rate and regular rhythm.      Heart sounds: Normal heart sounds.   Pulmonary:      Effort: Pulmonary effort is normal.      Breath sounds: Normal breath sounds.    Abdominal:      Palpations: Abdomen is soft.      Tenderness: There is no abdominal tenderness.   Musculoskeletal:      Right lower leg: No edema.      Left lower leg: No edema.   Neurological:      Mental Status: He is oriented to person, place, and time.   Psychiatric:         Mood and Affect: Mood normal.         Behavior: Behavior normal.             Assessment and Plan   Diagnoses and all orders for this visit:    1. Hypertension, essential (Primary)   Valsartan/hctz was refilled x 1 last week- patient to continue to monitor bp and keep f/up with cardiology for refills . RTC prior to recheck as needed.   2. Hyperlipidemia, mixed  Will get back on statin and may come in for fasting labs in 8 weeks.   3. Chronic obstructive pulmonary disease, unspecified COPD type  Have given contact information for him to call pulmonology to get appt scheduled since referral was already sent. He states hoarseness started past few weeks- felt maybe more allergy related-- but discussed if persists would recommend ENT evaluation to let us know.   Other orders  -     simvastatin (ZOCOR) 40 MG tablet; Take 1 tablet by mouth Every Night.  Dispense: 90 tablet; Refill: 0            Follow Up   No follow-ups on file.  Patient was given instructions and counseling regarding his condition or for health maintenance advice. Please see specific information pulled into the AVS if appropriate.     Minerva Dickerson PA-C

## 2023-05-03 ENCOUNTER — OFFICE VISIT (OUTPATIENT)
Dept: CARDIOLOGY | Facility: CLINIC | Age: 73
End: 2023-05-03
Payer: MEDICARE

## 2023-05-03 VITALS
BODY MASS INDEX: 23.3 KG/M2 | HEART RATE: 65 BPM | RESPIRATION RATE: 18 BRPM | WEIGHT: 172 LBS | OXYGEN SATURATION: 91 % | SYSTOLIC BLOOD PRESSURE: 142 MMHG | DIASTOLIC BLOOD PRESSURE: 80 MMHG | HEIGHT: 72 IN

## 2023-05-03 DIAGNOSIS — J43.2 CENTRILOBULAR EMPHYSEMA: ICD-10-CM

## 2023-05-03 DIAGNOSIS — I51.81 STRESS-INDUCED CARDIOMYOPATHY: Primary | ICD-10-CM

## 2023-05-03 DIAGNOSIS — E78.00 HYPERCHOLESTEROLEMIA: ICD-10-CM

## 2023-05-03 DIAGNOSIS — I50.41 ACUTE COMBINED SYSTOLIC (CONGESTIVE) AND DIASTOLIC (CONGESTIVE) HEART FAILURE: ICD-10-CM

## 2023-05-03 DIAGNOSIS — I10 ESSENTIAL HYPERTENSION: ICD-10-CM

## 2023-05-03 RX ORDER — SIMVASTATIN 40 MG
40 TABLET ORAL NIGHTLY
Qty: 90 TABLET | Refills: 2 | Status: SHIPPED | OUTPATIENT
Start: 2023-05-03

## 2023-05-03 RX ORDER — VALSARTAN AND HYDROCHLOROTHIAZIDE 160; 12.5 MG/1; MG/1
1 TABLET, FILM COATED ORAL DAILY
Qty: 90 TABLET | Refills: 2 | Status: SHIPPED | OUTPATIENT
Start: 2023-05-03

## 2023-05-03 RX ORDER — CARVEDILOL 6.25 MG/1
6.25 TABLET ORAL 2 TIMES DAILY WITH MEALS
Qty: 180 TABLET | Refills: 2 | Status: SHIPPED | OUTPATIENT
Start: 2023-05-03

## 2023-05-03 NOTE — PROGRESS NOTES
[facility]  [facility] C[facility]IOLOG[facility]  [address] [place] [address]  Dept: [phone]  Dept Fax: [phone]    Silviano Condon  1950    Follow Up Office Visit Note    History of Present Illness:  Silviano Condon is a 73 y.o. male who presents to the clinic for Follow-up. Stress cardiomyopathy-He has not been in the office for over 1 year, has not been taking his meds, has worsening of SOB and also edema, has COPD and uses inhalers. BP is 142.80.just on Coreg 6,25 bid, will restart the Valsartan 160.12.5. will get some lab     The following portions of the patient's history were reviewed and updated as appropriate: allergies, current medications, past family history, past medical history, past social history, past surgical history, and problem list.    Medications:  albuterol sulfate HFA  aspirin  carvedilol  simvastatin  Trelegy Ellipta aerosol powder   valsartan-hydrochlorothiazide    Subjective  Allergies   Allergen Reactions   • Ciprofloxacin Hives        Past Medical History:   Diagnosis Date   • CAD (coronary artery disease)    • Chronic osteoarthritis    • Colon cancer    • COPD (chronic obstructive pulmonary disease)    • Heart attack     4-5 months ago   • Hx of colonic polyps    • Hyperlipidemia    • Hypertension    • Hypertension    • Ischemic cardiomyopathy    • Noncompliance    • Tobacco abuse     PREVIOUS       Past Surgical History:   Procedure Laterality Date   • COLON RESECTION      DR. AMAYA   • POLYPECTOMY         Family History   Problem Relation Age of Onset   • Diabetes Mother    • Hypertension Other         Social History     Socioeconomic History   • Marital status:    Tobacco Use   • Smoking status: Former     Packs/day: 1.00     Years: 26.00     Pack years: 26.00     Types: Cigarettes     Start date:      Quit date:      Years since quittin.3   • Smokeless tobacco: Never   Vaping Use   • Vaping Use: Never used   Substance  "and Sexual Activity   • Alcohol use: Never   • Drug use: Never   • Sexual activity: Not Currently       Review of Systems   Constitutional: Negative.    HENT: Negative.    Respiratory: Positive for shortness of breath.    Cardiovascular: Positive for leg swelling.   Endocrine: Negative.    Genitourinary: Negative.    Musculoskeletal: Negative.    Skin: Negative.    Allergic/Immunologic: Negative.    Neurological: Negative.    Hematological: Negative.    Psychiatric/Behavioral: Negative.        Cardiovascular Procedures    ECHO/MUGA:  STRESS TESTS:   CARDIAC CATH:   DEVICES:   HOLTER:   CT/MRI:   VASCULAR:   CARDIOTHORACIC:     Objective  Vitals:    05/03/23 0922   BP: 142/80   BP Location: Left arm   Patient Position: Sitting   Cuff Size: Adult   Pulse: 65   Resp: 18   SpO2: 91%   Weight: 78 kg (172 lb)   Height: 182.9 cm (72\")   PainSc: 0-No pain     Body mass index is 23.33 kg/m².     Physical Exam  Vitals reviewed.   Constitutional:       Appearance: Healthy appearance. Not in distress.   Eyes:      Pupils: Pupils are equal, round, and reactive to light.   HENT:    Mouth/Throat:      Pharynx: Oropharynx is clear.   Neck:      Thyroid: Thyroid normal.      Vascular: No JVR. JVD normal.   Pulmonary:      Effort: Pulmonary effort is normal.      Breath sounds: Normal breath sounds. No wheezing. No rhonchi. No rales.   Chest:      Chest wall: Not tender to palpatation.   Cardiovascular:      PMI at left midclavicular line. Normal rate. Regular rhythm. Normal S1. Normal S2.      Murmurs: There is no murmur.      No gallop. No click. No rub.   Pulses:     Intact distal pulses.   Edema:     Peripheral edema absent.   Abdominal:      General: Bowel sounds are normal.      Palpations: Abdomen is soft.      Tenderness: There is no abdominal tenderness.   Musculoskeletal: Normal range of motion.         General: No tenderness.      Cervical back: Normal range of motion and neck supple. Skin:     General: Skin is warm and " dry.   Neurological:      General: No focal deficit present.      Mental Status: Alert and oriented to person, place and time.          Diagnostic Data    ECG 12 Lead    Date/Time: 5/3/2023 10:05 AM  Performed by: Douglas Collins MD  Authorized by: Douglas Collins MD   Comparison: compared with previous ECG from 9/7/2022  Comparison to previous ECG: His HR is lower today,   Rhythm: sinus bradycardia  Rate: bradycardic  BPM: 48  QRS axis: normal  Other findings: poor R wave progression    Clinical impression: abnormal EKG            Assessment and Plan  Diagnoses and all orders for this visit:    Stress-induced cardiomyopathy- with recovery of the Ef, on Coreg 6,25 bid, will restart Valsartan 160.112.5  -     CBC & Differential  -     Comprehensive Metabolic Panel  -     CK  -     proBNP  -     TSH    Essential hypertension- BP is 150.80, will restart Valsartan 160.12.5. Coreg 6,25 bid,   -     TSH    Hypercholesterolemia- Will restart Simvastatin  -     Lipid Panel  -     TSH    Centrilobular emphysema    Acute combined systolic (congestive) and diastolic (congestive) heart failure- with recovery Ef   -     proBNP    Other orders  -     valsartan-hydrochlorothiazide (DIOVAN-HCT) 160-12.5 MG per tablet; Take 1 tablet by mouth Daily.  -     carvedilol (COREG) 6.25 MG tablet; Take 1 tablet by mouth 2 (Two) Times a Day With Meals.  -     simvastatin (ZOCOR) 40 MG tablet; Take 1 tablet by mouth Every Night.         Return in about 3 months (around 8/3/2023) for Recheck with Dr. Collins.    Douglas Collins MD  05/03/2023

## 2023-05-04 ENCOUNTER — TELEPHONE (OUTPATIENT)
Dept: CARDIOLOGY | Facility: CLINIC | Age: 73
End: 2023-05-04
Payer: MEDICARE

## 2023-05-04 LAB
ALBUMIN SERPL-MCNC: 4 G/DL (ref 3.7–4.7)
ALBUMIN/GLOB SERPL: 1.7 {RATIO} (ref 1.2–2.2)
ALP SERPL-CCNC: 77 IU/L (ref 44–121)
ALT SERPL-CCNC: 10 IU/L (ref 0–44)
AST SERPL-CCNC: 18 IU/L (ref 0–40)
BASOPHILS # BLD AUTO: 0 X10E3/UL (ref 0–0.2)
BASOPHILS NFR BLD AUTO: 1 %
BILIRUB SERPL-MCNC: 0.6 MG/DL (ref 0–1.2)
BUN SERPL-MCNC: 25 MG/DL (ref 8–27)
BUN/CREAT SERPL: 17 (ref 10–24)
CALCIUM SERPL-MCNC: 9.3 MG/DL (ref 8.6–10.2)
CHLORIDE SERPL-SCNC: 103 MMOL/L (ref 96–106)
CHOLEST SERPL-MCNC: 217 MG/DL (ref 100–199)
CK SERPL-CCNC: 173 U/L (ref 41–331)
CO2 SERPL-SCNC: 23 MMOL/L (ref 20–29)
CREAT SERPL-MCNC: 1.47 MG/DL (ref 0.76–1.27)
EGFRCR SERPLBLD CKD-EPI 2021: 50 ML/MIN/1.73
EOSINOPHIL # BLD AUTO: 0.1 X10E3/UL (ref 0–0.4)
EOSINOPHIL NFR BLD AUTO: 3 %
ERYTHROCYTE [DISTWIDTH] IN BLOOD BY AUTOMATED COUNT: 12.5 % (ref 11.6–15.4)
GLOBULIN SER CALC-MCNC: 2.4 G/DL (ref 1.5–4.5)
GLUCOSE SERPL-MCNC: 88 MG/DL (ref 70–99)
HCT VFR BLD AUTO: 39.1 % (ref 37.5–51)
HDLC SERPL-MCNC: 52 MG/DL
HGB BLD-MCNC: 13.3 G/DL (ref 13–17.7)
IMM GRANULOCYTES # BLD AUTO: 0 X10E3/UL (ref 0–0.1)
IMM GRANULOCYTES NFR BLD AUTO: 0 %
LDLC SERPL CALC-MCNC: 150 MG/DL (ref 0–99)
LYMPHOCYTES # BLD AUTO: 1.1 X10E3/UL (ref 0.7–3.1)
LYMPHOCYTES NFR BLD AUTO: 31 %
MCH RBC QN AUTO: 30.9 PG (ref 26.6–33)
MCHC RBC AUTO-ENTMCNC: 34 G/DL (ref 31.5–35.7)
MCV RBC AUTO: 91 FL (ref 79–97)
MONOCYTES # BLD AUTO: 0.3 X10E3/UL (ref 0.1–0.9)
MONOCYTES NFR BLD AUTO: 9 %
NEUTROPHILS # BLD AUTO: 2 X10E3/UL (ref 1.4–7)
NEUTROPHILS NFR BLD AUTO: 56 %
NT-PROBNP SERPL-MCNC: 92 PG/ML (ref 0–376)
PLATELET # BLD AUTO: 151 X10E3/UL (ref 150–450)
POTASSIUM SERPL-SCNC: 4.8 MMOL/L (ref 3.5–5.2)
PROT SERPL-MCNC: 6.4 G/DL (ref 6–8.5)
RBC # BLD AUTO: 4.31 X10E6/UL (ref 4.14–5.8)
SODIUM SERPL-SCNC: 140 MMOL/L (ref 134–144)
TRIGL SERPL-MCNC: 86 MG/DL (ref 0–149)
TSH SERPL DL<=0.005 MIU/L-ACNC: 1.69 UIU/ML (ref 0.45–4.5)
VLDLC SERPL CALC-MCNC: 15 MG/DL (ref 5–40)
WBC # BLD AUTO: 3.5 X10E3/UL (ref 3.4–10.8)

## 2023-05-04 NOTE — TELEPHONE ENCOUNTER
----- Message from Douglas Collins MD sent at 5/4/2023  4:48 PM EDT -----  Lab are not bad but creatinine is 1,4 was much higher before so better, Lipids are high but  he was not taking his meds I think ?

## 2023-05-04 NOTE — TELEPHONE ENCOUNTER
Left a message for Mr Condon to call tomorrow and I would be happy to go over recent lab results.

## 2023-05-05 ENCOUNTER — OFFICE VISIT (OUTPATIENT)
Dept: FAMILY MEDICINE CLINIC | Facility: CLINIC | Age: 73
End: 2023-05-05
Payer: MEDICARE

## 2023-05-05 VITALS
TEMPERATURE: 97.5 F | HEART RATE: 76 BPM | DIASTOLIC BLOOD PRESSURE: 88 MMHG | OXYGEN SATURATION: 92 % | RESPIRATION RATE: 18 BRPM | BODY MASS INDEX: 23.3 KG/M2 | HEIGHT: 72 IN | SYSTOLIC BLOOD PRESSURE: 156 MMHG | WEIGHT: 172 LBS

## 2023-05-05 DIAGNOSIS — I10 HYPERTENSION, ESSENTIAL: ICD-10-CM

## 2023-05-05 DIAGNOSIS — E78.2 HYPERLIPIDEMIA, MIXED: ICD-10-CM

## 2023-05-05 DIAGNOSIS — J44.9 CHRONIC OBSTRUCTIVE PULMONARY DISEASE, UNSPECIFIED COPD TYPE: ICD-10-CM

## 2023-05-05 DIAGNOSIS — Z00.00 MEDICARE ANNUAL WELLNESS VISIT, INITIAL: Primary | ICD-10-CM

## 2023-05-05 PROCEDURE — G0438 PPPS, INITIAL VISIT: HCPCS | Performed by: PHYSICIAN ASSISTANT

## 2023-05-05 PROCEDURE — 3077F SYST BP >= 140 MM HG: CPT | Performed by: PHYSICIAN ASSISTANT

## 2023-05-05 PROCEDURE — 3079F DIAST BP 80-89 MM HG: CPT | Performed by: PHYSICIAN ASSISTANT

## 2023-05-05 PROCEDURE — 1170F FXNL STATUS ASSESSED: CPT | Performed by: PHYSICIAN ASSISTANT

## 2023-05-05 NOTE — PROGRESS NOTES
The ABCs of the Annual Wellness Visit  Initial Medicare Wellness Visit    Subjective     Silviano Condon is a 73 y.o. male who presents for an Initial Medicare Wellness Visit.    He states has been feeling well. He saw cardiology yesterday and back on blood pressure and cholesterol medication . He is awaiting an appt for pulmonology for COPD.     The following portions of the patient's history were reviewed and   updated as appropriate: allergies, current medications, past family history, past medical history, past social history, past surgical history and problem list.     Compared to one year ago, the patient feels his physical   health is better.    Compared to one year ago, the patient feels his mental   health is better.    Recent Hospitalizations:  He was not admitted to the hospital during the last year.       Current Medical Providers:  Patient Care Team:  Minerva Dickerson PA-C as PCP - General (Physician Assistant)    Outpatient Medications Prior to Visit   Medication Sig Dispense Refill   • albuterol sulfate  (90 Base) MCG/ACT inhaler Inhale 2 puffs Every 4 (Four) Hours As Needed for Wheezing.     • aspirin 81 MG chewable tablet Chew 1 tablet 2 (Two) Times a Day.     • carvedilol (COREG) 6.25 MG tablet Take 1 tablet by mouth 2 (Two) Times a Day With Meals. 180 tablet 2   • simvastatin (ZOCOR) 40 MG tablet Take 1 tablet by mouth Every Night. 90 tablet 2   • Trelegy Ellipta 100-62.5-25 MCG/INH inhaler Inhale 1 puff Daily.     • valsartan-hydrochlorothiazide (DIOVAN-HCT) 160-12.5 MG per tablet Take 1 tablet by mouth Daily. 90 tablet 2     No facility-administered medications prior to visit.       No opioid medication identified on active medication list. I have reviewed chart for other potential  high risk medication/s and harmful drug interactions in the elderly.          Aspirin is on active medication list. Aspirin use is indicated based on review of current medical condition/s. Pros and cons of  "this therapy have been discussed today. Benefits of this medication outweigh potential harm.  Patient has been encouraged to continue taking this medication.  .      Patient Active Problem List   Diagnosis   • Stress-induced cardiomyopathy   • Essential hypertension   • Hypercholesterolemia   • Centrilobular emphysema   • Medicare annual wellness visit, initial     Advance Care Planning   Advance Care Planning     Advance Directive is not on file.  ACP discussion was held with the patient during this visit. Patient does not have an advance directive, information provided.       Objective    Vitals:    23 0824   BP: 156/88   Pulse: 76   Resp: 18   Temp: 97.5 °F (36.4 °C)   TempSrc: Temporal   SpO2: 92%   Weight: 78 kg (172 lb)   Height: 182.9 cm (72\")   PainSc: 0-No pain     Estimated body mass index is 23.33 kg/m² as calculated from the following:    Height as of this encounter: 182.9 cm (72\").    Weight as of this encounter: 78 kg (172 lb).    BMI is within normal parameters. No other follow-up for BMI required.      Does the patient have evidence of cognitive impairment?   No    Lab Results   Component Value Date    CHLPL 217 (H) 2023    TRIG 86 2023    HDL 52 2023     (H) 2023    VLDL 15 2023        HEALTH RISK ASSESSMENT    Smoking Status:  Social History     Tobacco Use   Smoking Status Former   • Packs/day: 1.00   • Years: 26.00   • Pack years: 26.00   • Types: Cigarettes   • Start date:    • Quit date:    • Years since quittin.3   Smokeless Tobacco Never     Alcohol Consumption:  Social History     Substance and Sexual Activity   Alcohol Use Never     Fall Risk Screen:    STEADI Fall Risk Assessment was completed, and patient is at LOW risk for falls.Assessment completed on:2023    Depression Screen:       2023     8:37 AM   PHQ-2/PHQ-9 Depression Screening   Little Interest or Pleasure in Doing Things 1-->several days   Feeling Down, Depressed " or Hopeless 0-->not at all   PHQ-9: Brief Depression Severity Measure Score 1       Health Habits and Functional and Cognitive Screenin/5/2023     8:35 AM   Functional & Cognitive Status   Do you have difficulty preparing food and eating? No   Do you have difficulty bathing yourself, getting dressed or grooming yourself? No   Do you have difficulty using the toilet? No   Do you have difficulty moving around from place to place? No   Do you have trouble with steps or getting out of a bed or a chair? No   Current Diet Unhealthy Diet   Dental Exam Not up to date   Eye Exam Not up to date   Exercise (times per week) 1 times per week   Current Exercises Include Other   Do you need help using the phone?  No   Are you deaf or do you have serious difficulty hearing?  No   Do you need help with transportation? No   Do you need help shopping? No   Do you need help preparing meals?  No   Do you need help with housework?  No   Do you need help with laundry? No   Do you need help taking your medications? No   Do you need help managing money? No   Do you ever drive or ride in a car without wearing a seat belt? No   Have you felt unusual stress, anger or loneliness in the last month? Yes   Who do you live with? Spouse   If you need help, do you have trouble finding someone available to you? No   Have you been bothered in the last four weeks by sexual problems? No   Do you have difficulty concentrating, remembering or making decisions? No       Age-appropriate Screening Schedule:  Refer to the list below for future screening recommendations based on patient's age, sex and/or medical conditions. Orders for these recommended tests are listed in the plan section. The patient has been provided with a written plan.    Health Maintenance   Topic Date Due   • Pneumococcal Vaccine 65+ (1 - PCV) Never done   • TDAP/TD VACCINES (1 - Tdap) Never done   • ZOSTER VACCINE (1 of 2) Never done   • HEPATITIS C SCREENING  Never done   •  ANNUAL WELLNESS VISIT  Never done   • COVID-19 Vaccine (2 - Pfizer series) 05/15/2021   • AAA SCREEN (ONE-TIME)  Never done   • INFLUENZA VACCINE  08/01/2023   • LIPID PANEL  05/03/2024   • COLORECTAL CANCER SCREENING  12/15/2025          CMS Preventative Services Quick Reference  Risk Factors Identified During Encounter    None Identified    The above risks/problems have been discussed with the patient.  Pertinent information has been shared with the patient in the After Visit Summary.  An After Visit Summary and PPPS were made available to the patient.  Diagnoses and all orders for this visit:    1. Medicare annual wellness visit, initial (Primary)  Assessment & Plan:  Updated annual wellness visit checklist.  Immunizations discussed.  Screening up-to-date.  Recommend yearly dental and eye exams. Also discussed monitoring of blood pressure and lipids. We addressed patient self-assessment of health status, frailty, and physical functioning. We reviewed psychosocial risks, behavioral risks, instrumental activities of daily living, and patient health risk assessment. Patient was given a personalized prevention plan.       2. Chronic obstructive pulmonary disease, unspecified COPD type  -     Ambulatory Referral to Pulmonology  Continue fup with pulmonology as directed. Continue Trelegy.   3. Hypertension, essential  BP up some today. He states has gotten back on medication. Encouraged to monitor and  f/up with cardiology as directed.   4. Hyperlipidemia, mixed  Continue simvastatin. Encouraged healthy diet and exercise.   Follow Up:  Next Medicare Wellness visit to be scheduled in 1 year.

## 2023-08-03 ENCOUNTER — OFFICE VISIT (OUTPATIENT)
Dept: CARDIOLOGY | Facility: CLINIC | Age: 73
End: 2023-08-03
Payer: MEDICARE

## 2023-08-03 VITALS
HEIGHT: 72 IN | RESPIRATION RATE: 18 BRPM | SYSTOLIC BLOOD PRESSURE: 120 MMHG | HEART RATE: 60 BPM | DIASTOLIC BLOOD PRESSURE: 78 MMHG | BODY MASS INDEX: 23.62 KG/M2 | WEIGHT: 174.4 LBS | OXYGEN SATURATION: 96 %

## 2023-08-03 DIAGNOSIS — J43.2 CENTRILOBULAR EMPHYSEMA: ICD-10-CM

## 2023-08-03 DIAGNOSIS — I10 ESSENTIAL HYPERTENSION: ICD-10-CM

## 2023-08-03 DIAGNOSIS — I51.81 STRESS-INDUCED CARDIOMYOPATHY: Primary | ICD-10-CM

## 2023-08-03 DIAGNOSIS — E78.00 HYPERCHOLESTEROLEMIA: ICD-10-CM

## 2023-08-03 RX ORDER — SIMVASTATIN 40 MG
40 TABLET ORAL NIGHTLY
Qty: 90 TABLET | Refills: 3 | Status: SHIPPED | OUTPATIENT
Start: 2023-08-03

## 2023-08-03 RX ORDER — FLUTICASONE PROPIONATE AND SALMETEROL 50; 250 UG/1; UG/1
2 POWDER RESPIRATORY (INHALATION)
COMMUNITY
Start: 2023-06-21

## 2023-11-06 NOTE — TELEPHONE ENCOUNTER
Caller: FIFI BOSCH    Relationship: Emergency Contact    Best call back number:  [phone]     Requested Prescriptions:   Requested Prescriptions     Pending Prescriptions Disp Refills    albuterol sulfate  (90 Base) MCG/ACT inhaler       Sig: Inhale 2 puffs Every 4 (Four) Hours As Needed for Wheezing.    carvedilol (COREG) 6.25 MG tablet 180 tablet 2     Sig: Take 1 tablet by mouth 2 (Two) Times a Day With Meals.    simvastatin (ZOCOR) 40 MG tablet 90 tablet 3     Sig: Take 1 tablet by mouth Every Night.    valsartan-hydrochlorothiazide (DIOVAN-HCT) 160-12.5 MG per tablet 90 tablet 2     Sig: Take 1 tablet by mouth Daily.    Advair Diskus 250-50 MCG/ACT DISKUS       Sig: Inhale 2 puffs 2 (Two) Times a Day.        Pharmacy where request should be sent:    [facility] - [phone] [facility] [phone]  [phone]     Last office visit with prescribing clinician: 5/5/2023   Last telemedicine visit with prescribing clinician: Visit date not found   Next office visit with prescribing clinician: 11/9/2023     Additional details provided by patient: GRANDDAUGHTER ADVISED PATIENT'S BEEN OUT OF MEDICATION FOR TWO WEEKS AND ISN'T STAYING ON TOP OF THEM.      Does the patient have less than a 3 day supply:  [x] Yes  [] No    Would you like a call back once the refill request has been completed: [] Yes [x] No    If the office needs to give you a call back, can they leave a voicemail: [] Yes [x] No    Bernabe Almodovar Rep   11/06/23 16:16 EST

## 2023-11-07 RX ORDER — SIMVASTATIN 40 MG
40 TABLET ORAL NIGHTLY
Qty: 90 TABLET | Refills: 3 | OUTPATIENT
Start: 2023-11-07

## 2023-11-07 RX ORDER — VALSARTAN AND HYDROCHLOROTHIAZIDE 160; 12.5 MG/1; MG/1
1 TABLET, FILM COATED ORAL DAILY
Qty: 90 TABLET | Refills: 2 | OUTPATIENT
Start: 2023-11-07

## 2023-11-07 RX ORDER — FLUTICASONE PROPIONATE AND SALMETEROL 50; 250 UG/1; UG/1
2 POWDER RESPIRATORY (INHALATION)
OUTPATIENT
Start: 2023-11-07

## 2023-11-07 RX ORDER — ALBUTEROL SULFATE 90 UG/1
2 AEROSOL, METERED RESPIRATORY (INHALATION) EVERY 4 HOURS PRN
OUTPATIENT
Start: 2023-11-07

## 2023-11-07 RX ORDER — CARVEDILOL 6.25 MG/1
6.25 TABLET ORAL 2 TIMES DAILY WITH MEALS
Qty: 180 TABLET | Refills: 2 | OUTPATIENT
Start: 2023-11-07

## 2023-11-07 NOTE — TELEPHONE ENCOUNTER
HUB TO RELAY    Looks like these do not come from us.  Simvastatin, valsartan, carvedilol comes from dr. Collins.   Advair and albuterol aren't from us either.

## 2023-11-08 ENCOUNTER — TELEPHONE (OUTPATIENT)
Dept: CARDIOLOGY | Facility: CLINIC | Age: 73
End: 2023-11-08
Payer: MEDICARE

## 2023-11-08 NOTE — TELEPHONE ENCOUNTER
Patient's granddaughter called to request refills on Coreg, Zocor, and Diovan/HCT [facility]. He's been out of these meds

## 2023-11-14 ENCOUNTER — OFFICE VISIT (OUTPATIENT)
Dept: FAMILY MEDICINE CLINIC | Facility: CLINIC | Age: 73
End: 2023-11-14
Payer: MEDICARE

## 2023-11-14 VITALS
OXYGEN SATURATION: 96 % | BODY MASS INDEX: 22.75 KG/M2 | HEIGHT: 72 IN | TEMPERATURE: 98 F | WEIGHT: 168 LBS | DIASTOLIC BLOOD PRESSURE: 82 MMHG | SYSTOLIC BLOOD PRESSURE: 140 MMHG | HEART RATE: 68 BPM

## 2023-11-14 DIAGNOSIS — J40 BRONCHITIS: Primary | ICD-10-CM

## 2023-11-14 DIAGNOSIS — J44.9 CHRONIC OBSTRUCTIVE PULMONARY DISEASE, UNSPECIFIED COPD TYPE: ICD-10-CM

## 2023-11-14 DIAGNOSIS — R60.0 LOWER LEG EDEMA: ICD-10-CM

## 2023-11-14 PROCEDURE — 3077F SYST BP >= 140 MM HG: CPT | Performed by: PHYSICIAN ASSISTANT

## 2023-11-14 PROCEDURE — 3079F DIAST BP 80-89 MM HG: CPT | Performed by: PHYSICIAN ASSISTANT

## 2023-11-14 PROCEDURE — 99213 OFFICE O/P EST LOW 20 MIN: CPT | Performed by: PHYSICIAN ASSISTANT

## 2023-11-14 RX ORDER — PREDNISONE 20 MG/1
TABLET ORAL
Qty: 9 TABLET | Refills: 0 | Status: SHIPPED | OUTPATIENT
Start: 2023-11-14

## 2023-11-14 RX ORDER — DOXYCYCLINE HYCLATE 100 MG/1
100 CAPSULE ORAL 2 TIMES DAILY
Qty: 14 CAPSULE | Refills: 0 | Status: SHIPPED | OUTPATIENT
Start: 2023-11-14 | End: 2023-11-21

## 2023-11-14 NOTE — PROGRESS NOTES
"Chief Complaint  Shortness of Breath (Has been more short of breath, nasal congestion and cough for past few weeks. )    Subjective          Silviano Condon presents to [facility]  History of Present Illness  Patient in today for what he feels is a flareup of his copd. States has been coughing up congestion for past few weeks with nasal congestion as well. Denies fever. No vomiting or diarrhea. He admits has not been taking maintenance inhaler- has only used albuterol. He has not f/up with pulmonology as states his is retiring. He states has had intermittent swelling to lower legs over past month as well and plans to contact his cardiologist to discuss. Denies chest pain.   Shortness of Breath  This is a chronic problem. Associated symptoms include leg swelling and a sore throat. Pertinent negatives include no abdominal pain, chest pain, fever, leg pain or vomiting. His past medical history is significant for COPD.       Objective   Vital Signs:   /82   Pulse 68   Temp 98 °F (36.7 °C)   Ht 182.9 cm (72\")   Wt 76.2 kg (168 lb)   SpO2 96%   BMI 22.78 kg/m²     Body mass index is 22.78 kg/m².    Review of Systems   Constitutional:  Negative for fever.   HENT:  Positive for congestion and sore throat.    Respiratory:  Positive for cough and shortness of breath.    Cardiovascular:  Positive for leg swelling. Negative for chest pain and palpitations.   Gastrointestinal:  Negative for abdominal pain, diarrhea, nausea and vomiting.   Neurological:  Negative for dizziness and headache.       Past History:  Medical History: has a past medical history of CAD (coronary artery disease), Chronic osteoarthritis, Colon cancer, COPD (chronic obstructive pulmonary disease), Heart attack, colonic polyps, Hyperlipidemia, Hypertension, Hypertension, Ischemic cardiomyopathy, Noncompliance, and Tobacco abuse.   Surgical History: has a past surgical history that includes Polypectomy and Colectomy " (2014).   Family History: family history includes Diabetes in his mother; Hypertension in an other family member.   Social History: reports that he quit smoking about 26 years ago. His smoking use included cigarettes. He started smoking about 52 years ago. He has a 26.00 pack-year smoking history. He has never used smokeless tobacco. He reports that he does not drink alcohol and does not use drugs.      Current Outpatient Medications:     albuterol sulfate  (90 Base) MCG/ACT inhaler, Inhale 2 puffs Every 4 (Four) Hours As Needed for Wheezing., Disp: , Rfl:     aspirin 81 MG chewable tablet, Chew 1 tablet 2 (Two) Times a Day., Disp: , Rfl:     carvedilol (COREG) 6.25 MG tablet, Take 1 tablet by mouth 2 (Two) Times a Day With Meals., Disp: 180 tablet, Rfl: 2    simvastatin (ZOCOR) 40 MG tablet, Take 1 tablet by mouth Every Night., Disp: 90 tablet, Rfl: 3    valsartan-hydrochlorothiazide (DIOVAN-HCT) 160-12.5 MG per tablet, Take 1 tablet by mouth Daily., Disp: 90 tablet, Rfl: 2    Advair Diskus 250-50 MCG/ACT DISKUS, Inhale 2 puffs 2 (Two) Times a Day., Disp: , Rfl:   Allergies: Ciprofloxacin    Physical Exam  Constitutional:       Appearance: Normal appearance.   HENT:      Right Ear: Tympanic membrane normal.      Left Ear: Tympanic membrane normal.      Mouth/Throat:      Pharynx: Oropharynx is clear.   Eyes:      Conjunctiva/sclera: Conjunctivae normal.      Pupils: Pupils are equal, round, and reactive to light.   Cardiovascular:      Rate and Rhythm: Normal rate and regular rhythm.      Heart sounds: Normal heart sounds.      Comments: Minimal swelling noted to bilateral lower legs- nonpitting; nontender to palpate   Pulmonary:      Effort: Pulmonary effort is normal.      Breath sounds: Normal breath sounds.      Comments: Faint wheeze noted bilaterally.   Abdominal:      Palpations: Abdomen is soft.      Tenderness: There is no abdominal tenderness.   Neurological:      Mental Status: He is oriented to  person, place, and time.   Psychiatric:         Mood and Affect: Mood normal.         Behavior: Behavior normal.             Assessment and Plan   Diagnoses and all orders for this visit:    1. Bronchitis (Primary)  Will send in a few days of prednisone and start on antibiotic; monitor symptoms closely and rtc or to ER if not improving  2. Chronic obstructive pulmonary disease, unspecified COPD type  -     Ambulatory Referral to Pulmonology  Encouraged patient to get back on maintenance inhaler and will put in referral for pulmonology consult-- rtc or to ER prior if not improving.   3. Lower leg edema  Patient states this has been an ongoing issue past month or longer- he states is going to call his cardiologist when leaves office today to get scheduled for consult.           Follow Up   No follow-ups on file.  Patient was given instructions and counseling regarding his condition or for health maintenance advice. Please see specific information pulled into the AVS if appropriate.     Minerva Dickerson PA-C

## 2024-01-04 ENCOUNTER — OFFICE VISIT (OUTPATIENT)
Dept: CARDIOLOGY | Facility: CLINIC | Age: 74
End: 2024-01-04
Payer: MEDICARE

## 2024-01-04 VITALS
RESPIRATION RATE: 18 BRPM | TEMPERATURE: 98 F | HEART RATE: 74 BPM | HEIGHT: 72 IN | DIASTOLIC BLOOD PRESSURE: 78 MMHG | OXYGEN SATURATION: 99 % | BODY MASS INDEX: 23.16 KG/M2 | SYSTOLIC BLOOD PRESSURE: 134 MMHG | WEIGHT: 171 LBS

## 2024-01-04 DIAGNOSIS — E78.00 HYPERCHOLESTEROLEMIA: ICD-10-CM

## 2024-01-04 DIAGNOSIS — I10 ESSENTIAL HYPERTENSION: ICD-10-CM

## 2024-01-04 DIAGNOSIS — J43.2 CENTRILOBULAR EMPHYSEMA: ICD-10-CM

## 2024-01-04 DIAGNOSIS — I51.81 STRESS-INDUCED CARDIOMYOPATHY: Primary | ICD-10-CM

## 2024-01-04 DIAGNOSIS — I42.0 DILATED CARDIOMYOPATHY: ICD-10-CM

## 2024-01-04 NOTE — PROGRESS NOTES
[facility]  [facility]  [address] [place] [address]  Dept: [phone]  Dept Fax: [phone]    Silviano Condon  1950    Follow Up Office Visit Note    History of Present Illness:  Silviano Condon is a 73 y.o. male who presents to the clinic for Follow-up and Edema.He has history of stress cardiomyopathy with recovery of the EF for the last month has noticed edema lower extremities, he also has some SOB, on activities no chest pain, he claims taking all his meds, last creatinine was 1,5. , EKG sinus will get an echo, lab BNP, TSH and urine text he is on Valsartan HCTZ 160.12.5 and Coreg 6,25 bid,     The following portions of the patient's history were reviewed and updated as appropriate: allergies, current medications, past family history, past medical history, past social history, past surgical history, and problem list.    Medications:  Advair Diskus aerosol powder   albuterol sulfate HFA  aspirin  carvedilol  simvastatin  valsartan-hydrochlorothiazide    Subjective  Allergies   Allergen Reactions    Ciprofloxacin Hives        Past Medical History:   Diagnosis Date    CAD (coronary artery disease)     Chronic osteoarthritis     Colon cancer     COPD (chronic obstructive pulmonary disease)     Heart attack     4-5 months ago    Hx of colonic polyps     Hyperlipidemia     Hypertension     Hypertension     Ischemic cardiomyopathy     Noncompliance     Tobacco abuse     PREVIOUS       Past Surgical History:   Procedure Laterality Date    COLON RESECTION  2014    DR. AMAYA    POLYPECTOMY         Family History   Problem Relation Age of Onset    Diabetes Mother     Hypertension Other         Social History     Socioeconomic History    Marital status:    Tobacco Use    Smoking status: Former     Packs/day: 1.00     Years: 26.00     Additional pack years: 0.00     Total pack years: 26.00     Types: Cigarettes     Start date: 1971     Quit date: 1997      "Years since quittin.0    Smokeless tobacco: Never   Vaping Use    Vaping Use: Never used   Substance and Sexual Activity    Alcohol use: Never    Drug use: Never    Sexual activity: Not Currently       Review of Systems   Constitutional: Negative.    HENT: Negative.     Respiratory:  Positive for shortness of breath.    Cardiovascular:  Positive for leg swelling.   Endocrine: Negative.    Genitourinary: Negative.    Musculoskeletal: Negative.    Skin: Negative.    Allergic/Immunologic: Negative.    Neurological: Negative.    Hematological: Negative.    Psychiatric/Behavioral: Negative.         Cardiovascular Procedures    ECHO/MUGA:  STRESS TESTS:   CARDIAC CATH:   DEVICES:   HOLTER:   CT/MRI:   VASCULAR:   CARDIOTHORACIC:     Objective  Vitals:    24 1344   BP: 134/78   BP Location: Right arm   Patient Position: Lying   Cuff Size: Adult   Pulse: 74   Resp: 18   Temp: 98 °F (36.7 °C)   TempSrc: Infrared   SpO2: 99%   Weight: 77.6 kg (171 lb)   Height: 182.9 cm (72\")   PainSc: 0-No pain     Body mass index is 23.19 kg/m².     Physical Exam  Vitals reviewed.   Constitutional:       Appearance: Healthy appearance. Not in distress.   Eyes:      Pupils: Pupils are equal, round, and reactive to light.   HENT:    Mouth/Throat:      Pharynx: Oropharynx is clear.   Neck:      Thyroid: Thyroid normal.      Vascular: No JVR. JVD normal.   Pulmonary:      Effort: Pulmonary effort is normal.      Breath sounds: Normal breath sounds. No wheezing. No rhonchi. No rales.   Chest:      Chest wall: Not tender to palpatation.   Cardiovascular:      PMI at left midclavicular line. Normal rate. Regular rhythm. Normal S1. Normal S2.       Murmurs: There is no murmur.      No gallop.  No click. No rub.   Pulses:     Intact distal pulses.      Carotid: 3+ bilaterally.     Radial: 3+ bilaterally.     Femoral: 3+ bilaterally.     Dorsalis pedis: 3+ bilaterally.     Posterior tibial: 3+ bilaterally.  Edema:     Thigh: bilateral 2+ " edema of the thigh.     Pretibial: bilateral 2+ edema of the pretibial area.     Ankle: bilateral 2+ edema of the ankle.     Feet: bilateral 2+ edema of the feet.  Abdominal:      General: Bowel sounds are normal.      Palpations: Abdomen is soft.      Tenderness: There is no abdominal tenderness.   Musculoskeletal: Normal range of motion.         General: No tenderness.      Cervical back: Normal range of motion and neck supple. Skin:     General: Skin is warm and dry.   Neurological:      General: No focal deficit present.      Mental Status: Alert and oriented to person, place and time.        Diagnostic Data    ECG 12 Lead    Date/Time: 1/4/2024 2:05 PM  Performed by: Douglas Collins MD    Authorized by: Douglas Collins MD  Comparison: compared with previous ECG from 5/3/2023  Similar to previous ECG  Rhythm: sinus rhythm  Rate: normal  BPM: 69  Other findings: non-specific ST-T wave changes    Clinical impression: abnormal EKG        Assessment and Plan  Diagnoses and all orders for this visit:    Stress-induced cardiomyopathy- on Valsartan 160.12.5. Coreg 6,25 bid, has more edema and seems more SOB, will get lab and also an echo, last one in 2021 has recovery of the Ef to normal .,  -     CBC & Differential  -     Comprehensive Metabolic Panel  -     proBNP  -     Urinalysis With Microscopic - Urine, Clean Catch  -     Adult Transthoracic Echo Complete W/ Cont if Necessary Per Protocol; Future    Essential hypertension- BP is 110.60 on Coreg 6,25 bid and Valsartan 160.12.5    Hypercholesterolemia- On Simvastatin     Centrilobular emphysema    Dilated cardiomyopathy- as above   -     proBNP  -     Adult Transthoracic Echo Complete W/ Cont if Necessary Per Protocol; Future         Return in about 2 weeks (around 1/18/2024) for Recheck with Dr. Collins.    Douglas Collins MD  01/04/2024

## 2024-01-05 ENCOUNTER — TELEPHONE (OUTPATIENT)
Dept: CARDIOLOGY | Facility: CLINIC | Age: 74
End: 2024-01-05
Payer: MEDICARE

## 2024-01-05 LAB
ALBUMIN SERPL-MCNC: 4.2 G/DL (ref 3.8–4.8)
ALBUMIN/GLOB SERPL: 1.6 {RATIO} (ref 1.2–2.2)
ALP SERPL-CCNC: 83 IU/L (ref 44–121)
ALT SERPL-CCNC: 9 IU/L (ref 0–44)
APPEARANCE UR: ABNORMAL
AST SERPL-CCNC: 13 IU/L (ref 0–40)
BACTERIA #/AREA URNS HPF: ABNORMAL /[HPF]
BASOPHILS # BLD AUTO: 0 X10E3/UL (ref 0–0.2)
BASOPHILS NFR BLD AUTO: 1 %
BILIRUB SERPL-MCNC: 0.7 MG/DL (ref 0–1.2)
BILIRUB UR QL STRIP: NEGATIVE
BUN SERPL-MCNC: 25 MG/DL (ref 8–27)
BUN/CREAT SERPL: 16 (ref 10–24)
CALCIUM SERPL-MCNC: 9.5 MG/DL (ref 8.6–10.2)
CASTS URNS QL MICRO: ABNORMAL /LPF
CHLORIDE SERPL-SCNC: 100 MMOL/L (ref 96–106)
CO2 SERPL-SCNC: 23 MMOL/L (ref 20–29)
COLOR UR: YELLOW
CREAT SERPL-MCNC: 1.58 MG/DL (ref 0.76–1.27)
EGFRCR SERPLBLD CKD-EPI 2021: 46 ML/MIN/1.73
EOSINOPHIL # BLD AUTO: 0.2 X10E3/UL (ref 0–0.4)
EOSINOPHIL NFR BLD AUTO: 4 %
EPI CELLS #/AREA URNS HPF: >10 /HPF (ref 0–10)
ERYTHROCYTE [DISTWIDTH] IN BLOOD BY AUTOMATED COUNT: 12.3 % (ref 11.6–15.4)
GLOBULIN SER CALC-MCNC: 2.6 G/DL (ref 1.5–4.5)
GLUCOSE SERPL-MCNC: 156 MG/DL (ref 70–99)
GLUCOSE UR QL STRIP: NEGATIVE
HCT VFR BLD AUTO: 39.6 % (ref 37.5–51)
HGB BLD-MCNC: 13.8 G/DL (ref 13–17.7)
HGB UR QL STRIP: NEGATIVE
IMM GRANULOCYTES # BLD AUTO: 0 X10E3/UL (ref 0–0.1)
IMM GRANULOCYTES NFR BLD AUTO: 0 %
KETONES UR QL STRIP: NEGATIVE
LEUKOCYTE ESTERASE UR QL STRIP: ABNORMAL
LYMPHOCYTES # BLD AUTO: 1.2 X10E3/UL (ref 0.7–3.1)
LYMPHOCYTES NFR BLD AUTO: 26 %
MCH RBC QN AUTO: 31.2 PG (ref 26.6–33)
MCHC RBC AUTO-ENTMCNC: 34.8 G/DL (ref 31.5–35.7)
MCV RBC AUTO: 89 FL (ref 79–97)
MICRO URNS: ABNORMAL
MONOCYTES # BLD AUTO: 0.3 X10E3/UL (ref 0.1–0.9)
MONOCYTES NFR BLD AUTO: 7 %
NEUTROPHILS # BLD AUTO: 2.9 X10E3/UL (ref 1.4–7)
NEUTROPHILS NFR BLD AUTO: 62 %
NITRITE UR QL STRIP: NEGATIVE
NT-PROBNP SERPL-MCNC: 78 PG/ML (ref 0–376)
PH UR STRIP: 5 [PH] (ref 5–7.5)
PLATELET # BLD AUTO: 189 X10E3/UL (ref 150–450)
POTASSIUM SERPL-SCNC: 4.3 MMOL/L (ref 3.5–5.2)
PROT SERPL-MCNC: 6.8 G/DL (ref 6–8.5)
PROT UR QL STRIP: NEGATIVE
RBC # BLD AUTO: 4.43 X10E6/UL (ref 4.14–5.8)
RBC #/AREA URNS HPF: ABNORMAL /HPF (ref 0–2)
SODIUM SERPL-SCNC: 139 MMOL/L (ref 134–144)
SP GR UR STRIP: 1.02 (ref 1–1.03)
TSH SERPL DL<=0.005 MIU/L-ACNC: 1.49 UIU/ML (ref 0.45–4.5)
UROBILINOGEN UR STRIP-MCNC: 0.2 MG/DL (ref 0.2–1)
WBC # BLD AUTO: 4.7 X10E3/UL (ref 3.4–10.8)
WBC #/AREA URNS HPF: >30 /HPF (ref 0–5)

## 2024-01-05 NOTE — TELEPHONE ENCOUNTER
Spoke with Mr. Condon and he did get my message about the UTI I left.  I advised I spoke with Mady Dickerson and they do not have an appt today, however you can go to the Saturday clinic for evaluation.  He verbalized he would.

## 2024-01-05 NOTE — TELEPHONE ENCOUNTER
----- Message from Douglas Collins MD sent at 1/5/2024  8:50 AM EST -----  The urine test indicates uti, needs to discuss with PCP quickly today is Friday,   the others lab are still pending

## 2024-01-05 NOTE — TELEPHONE ENCOUNTER
----- Message from Douglas Collins MD sent at 1/5/2024  4:12 PM EST -----  BNP is good, TSH is good, CMP is good, except creatinine 1,5 but was high before  we are waiting for echo

## 2024-01-05 NOTE — TELEPHONE ENCOUNTER
Left a message for Mr. Condon that his urine lab showed UTI and I sent the results to BEAN Dickerson that we have asked you to f/u with her concerning this issue.

## 2024-01-17 ENCOUNTER — OFFICE VISIT (OUTPATIENT)
Dept: CARDIOLOGY | Facility: CLINIC | Age: 74
End: 2024-01-17
Payer: MEDICARE

## 2024-01-17 VITALS
SYSTOLIC BLOOD PRESSURE: 124 MMHG | DIASTOLIC BLOOD PRESSURE: 80 MMHG | BODY MASS INDEX: 23.16 KG/M2 | TEMPERATURE: 98 F | RESPIRATION RATE: 18 BRPM | HEART RATE: 68 BPM | OXYGEN SATURATION: 99 % | WEIGHT: 171 LBS | HEIGHT: 72 IN

## 2024-01-17 DIAGNOSIS — E78.00 HYPERCHOLESTEROLEMIA: ICD-10-CM

## 2024-01-17 DIAGNOSIS — I51.81 STRESS-INDUCED CARDIOMYOPATHY: ICD-10-CM

## 2024-01-17 DIAGNOSIS — I10 ESSENTIAL HYPERTENSION: Primary | ICD-10-CM

## 2024-01-17 RX ORDER — CARVEDILOL 6.25 MG/1
6.25 TABLET ORAL 2 TIMES DAILY WITH MEALS
Qty: 180 TABLET | Refills: 2 | Status: SHIPPED | OUTPATIENT
Start: 2024-01-17

## 2024-01-17 RX ORDER — VALSARTAN AND HYDROCHLOROTHIAZIDE 160; 12.5 MG/1; MG/1
1 TABLET, FILM COATED ORAL DAILY
Qty: 90 TABLET | Refills: 2 | Status: SHIPPED | OUTPATIENT
Start: 2024-01-17

## 2024-01-17 NOTE — PROGRESS NOTES
[facility] [place]  [facility] CARDIOLOGY  [address] [place] [address]  Dept: [phone]  Dept Fax: [phone]    Silviano Condon  1950    Follow Up Office Visit Note    History of Present Illness:  Silviano Condon is a 74 y.o. male who presents to the clinic for Follow-up. Stress cardiomyopathy- He seems doing better, his Ef normalized, as she was complaining of being more SOB, we repeat the echo and EF is good, he has an appointment to see Dr High lung doctor, on Valsartan 160.12.5 and Coreg 6,25 bid, will keep same meds    The following portions of the patient's history were reviewed and updated as appropriate: allergies, current medications, past family history, past medical history, past social history, past surgical history, and problem list.    Medications:  Advair Diskus aerosol powder   albuterol sulfate HFA  aspirin  carvedilol  simvastatin  valsartan-hydrochlorothiazide    Subjective  Allergies   Allergen Reactions    Ciprofloxacin Hives        Past Medical History:   Diagnosis Date    CAD (coronary artery disease)     Chronic osteoarthritis     Colon cancer     COPD (chronic obstructive pulmonary disease)     Heart attack     4-5 months ago    Hx of colonic polyps     Hyperlipidemia     Hypertension     Hypertension     Ischemic cardiomyopathy     Noncompliance     Tobacco abuse     PREVIOUS       Past Surgical History:   Procedure Laterality Date    COLON RESECTION      DR. AMAYA    POLYPECTOMY         Family History   Problem Relation Age of Onset    Diabetes Mother     Hypertension Other         Social History     Socioeconomic History    Marital status:    Tobacco Use    Smoking status: Former     Packs/day: 1.00     Years: 26.00     Additional pack years: 0.00     Total pack years: 26.00     Types: Cigarettes     Start date:      Quit date:      Years since quittin.0    Smokeless tobacco: Never   Vaping Use    Vaping Use: Never  "used   Substance and Sexual Activity    Alcohol use: Never    Drug use: Never    Sexual activity: Not Currently       Review of Systems   Constitutional: Negative.    HENT: Negative.     Respiratory: Negative.  Positive for shortness of breath.    Cardiovascular: Negative.    Endocrine: Negative.    Genitourinary: Negative.    Musculoskeletal: Negative.    Skin: Negative.    Allergic/Immunologic: Negative.    Neurological: Negative.    Hematological: Negative.    Psychiatric/Behavioral: Negative.       Cardiovascular Procedures    ECHO/MUGA:  STRESS TESTS:   CARDIAC CATH:   DEVICES:   HOLTER:   CT/MRI:   VASCULAR:   CARDIOTHORACIC:     Objective  Vitals:    01/17/24 1314   BP: 124/80   BP Location: Right arm   Patient Position: Lying   Cuff Size: Adult   Pulse: 68   Resp: 18   Temp: 98 °F (36.7 °C)   TempSrc: Infrared   SpO2: 99%   Weight: 77.6 kg (171 lb)   Height: 182.9 cm (72\")   PainSc: 0-No pain     Body mass index is 23.19 kg/m².     Physical Exam  Vitals reviewed.   Constitutional:       Appearance: Healthy appearance. Not in distress.   Eyes:      Pupils: Pupils are equal, round, and reactive to light.   HENT:    Mouth/Throat:      Pharynx: Oropharynx is clear.   Neck:      Thyroid: Thyroid normal.      Vascular: No JVR. JVD normal.   Pulmonary:      Effort: Pulmonary effort is normal.      Breath sounds: Normal breath sounds. No wheezing. No rhonchi. No rales.   Chest:      Chest wall: Not tender to palpatation.   Cardiovascular:      PMI at left midclavicular line. Normal rate. Regular rhythm. Normal S1. Normal S2.       Murmurs: There is no murmur.      No gallop.  No click. No rub.   Pulses:     Intact distal pulses.      Carotid: 3+ bilaterally.     Radial: 3+ bilaterally.     Femoral: 3+ bilaterally.     Dorsalis pedis: 3+ bilaterally.     Posterior tibial: 3+ bilaterally.  Edema:     Peripheral edema absent.   Abdominal:      General: Bowel sounds are normal.      Palpations: Abdomen is soft.      " Tenderness: There is no abdominal tenderness.   Musculoskeletal: Normal range of motion.         General: No tenderness.      Cervical back: Normal range of motion and neck supple. Skin:     General: Skin is warm and dry.   Neurological:      General: No focal deficit present.      Mental Status: Alert and oriented to person, place and time.        Diagnostic Data  Procedures    Assessment and Plan  Diagnoses and all orders for this visit:    Essential hypertension-BP is fine 120.70, will keep same meds Valsartan 160.12.5. and Coreg 6,25 bid    Hypercholesterolemia- On Simvastatin, tolerates well , we need a Lipid    Stress-induced cardiomyopathy- With recovery of the Ef to normal    Other orders  -     carvedilol (COREG) 6.25 MG tablet; Take 1 tablet by mouth 2 (Two) Times a Day With Meals.  -     valsartan-hydrochlorothiazide (DIOVAN-HCT) 160-12.5 MG per tablet; Take 1 tablet by mouth Daily.         Return in about 6 months (around 7/17/2024) for Recheck with Dr. Collins.    Douglas Collins MD  01/17/2024

## 2024-07-17 ENCOUNTER — OFFICE VISIT (OUTPATIENT)
Dept: CARDIOLOGY | Facility: CLINIC | Age: 74
End: 2024-07-17
Payer: MEDICARE

## 2024-07-17 VITALS
RESPIRATION RATE: 18 BRPM | OXYGEN SATURATION: 96 % | BODY MASS INDEX: 22.35 KG/M2 | SYSTOLIC BLOOD PRESSURE: 122 MMHG | WEIGHT: 165 LBS | HEIGHT: 72 IN | HEART RATE: 72 BPM | DIASTOLIC BLOOD PRESSURE: 76 MMHG

## 2024-07-17 DIAGNOSIS — I10 ESSENTIAL HYPERTENSION: Primary | ICD-10-CM

## 2024-07-17 DIAGNOSIS — J43.2 CENTRILOBULAR EMPHYSEMA: ICD-10-CM

## 2024-07-17 DIAGNOSIS — I51.81 STRESS-INDUCED CARDIOMYOPATHY: ICD-10-CM

## 2024-07-17 DIAGNOSIS — E78.00 HYPERCHOLESTEROLEMIA: ICD-10-CM

## 2024-07-17 NOTE — PROGRESS NOTES
Venipuncture Blood Specimen Collection  Venipuncture performed in clinic by Tiffani Jay RN with good hemostasis. Patient tolerated the procedure well without complications.   07/17/24   Tiffani Jay RN

## 2024-07-17 NOTE — PROGRESS NOTES
[facility] [place]  [facility] CARDIOLOGY  [address] [place] [address]  Dept: [phone]  Dept Fax: [phone]    Silviano Condon  1950    Follow Up Office Visit Note    History of Present Illness:  Silviano Condon is a 74 y.o. male who presents to the clinic for Follow-up. Stress cardiomyopathy- He denies any CP, has some SOB, has some edema, his Ef recover to normal on Valsartan 160.12.5 and also Coreg 6,25 bid,  last echo  normal EF , , he has some dizziness standing, will d.c Core as his BP normalized     The following portions of the patient's history were reviewed and updated as appropriate: allergies, current medications, past family history, past medical history, past social history, past surgical history, and problem list.    Medications:  Advair Diskus aerosol powder   albuterol sulfate HFA  aspirin  carvedilol  simvastatin  valsartan-hydrochlorothiazide    Subjective  Allergies   Allergen Reactions    Ciprofloxacin Hives        Past Medical History:   Diagnosis Date    CAD (coronary artery disease)     Chronic osteoarthritis     Colon cancer     COPD (chronic obstructive pulmonary disease)     Heart attack     4-5 months ago    Hx of colonic polyps     Hyperlipidemia     Hypertension     Hypertension     Ischemic cardiomyopathy     Noncompliance     Tobacco abuse     PREVIOUS       Past Surgical History:   Procedure Laterality Date    COLON RESECTION      DR. AMAYA    POLYPECTOMY         Family History   Problem Relation Age of Onset    Diabetes Mother     Hypertension Other         Social History     Socioeconomic History    Marital status:    Tobacco Use    Smoking status: Former     Current packs/day: 0.00     Average packs/day: 1 pack/day for 26.0 years (26.0 ttl pk-yrs)     Types: Cigarettes     Start date:      Quit date:      Years since quittin.5    Smokeless tobacco: Never   Vaping Use    Vaping status: Never Used   Substance  "and Sexual Activity    Alcohol use: Never    Drug use: Never    Sexual activity: Not Currently       Review of Systems   Constitutional: Negative.    HENT: Negative.     Respiratory: Negative.     Cardiovascular: Negative.    Endocrine: Negative.    Genitourinary: Negative.    Musculoskeletal: Negative.    Skin: Negative.    Allergic/Immunologic: Negative.    Neurological: Negative.    Hematological: Negative.    Psychiatric/Behavioral: Negative.       Cardiovascular Procedures    ECHO/MUGA:  STRESS TESTS:   CARDIAC CATH:   DEVICES:   HOLTER:   CT/MRI:   VASCULAR:   CARDIOTHORACIC:     Objective  Vitals:    07/17/24 1322   BP: 122/76   BP Location: Right arm   Patient Position: Lying   Cuff Size: Adult   Pulse: 72   Resp: 18   SpO2: 96%   Weight: 74.8 kg (165 lb)   Height: 182.9 cm (72\")   PainSc: 0-No pain     Body mass index is 22.38 kg/m².     Physical Exam  Vitals reviewed.   Constitutional:       Appearance: Healthy appearance. Not in distress.   Eyes:      Pupils: Pupils are equal, round, and reactive to light.   HENT:    Mouth/Throat:      Pharynx: Oropharynx is clear.   Neck:      Thyroid: Thyroid normal.      Vascular: No JVR. JVD normal.   Pulmonary:      Effort: Pulmonary effort is normal.      Breath sounds: Normal breath sounds. No wheezing. No rhonchi. No rales.   Chest:      Chest wall: Not tender to palpatation.   Cardiovascular:      PMI at left midclavicular line. Normal rate. Regular rhythm. Normal S1. Normal S2.       Murmurs: There is no murmur.      No gallop.  No click. No rub.   Pulses:     Intact distal pulses.      Carotid: 3+ bilaterally.     Radial: 3+ bilaterally.     Femoral: 3+ bilaterally.     Dorsalis pedis: 3+ bilaterally.     Posterior tibial: 3+ bilaterally.  Edema:     Peripheral edema absent.   Abdominal:      General: Bowel sounds are normal.      Palpations: Abdomen is soft.      Tenderness: There is no abdominal tenderness.   Musculoskeletal: Normal range of motion.         " General: No tenderness.      Cervical back: Normal range of motion and neck supple. Skin:     General: Skin is warm and dry.   Neurological:      General: No focal deficit present.      Mental Status: Alert and oriented to person, place and time.        Diagnostic Data  Procedures    Assessment and Plan  Diagnoses and all orders for this visit:    Essential hypertension- BP I 100.,60, will d,c Coreg will keep Valsartan 160.,12.5    Hypercholesterolemia- On Simvastatin, tolerates well, will get a Lipid     Stress-induced cardiomyopathy- with normalization of the Ef to normal, will d,c Coreg, will keep just valsartan 16-0.12.5    Centrilobular emphysema         No follow-ups on file.    Douglas Collins MD  07/17/2024

## 2024-07-18 ENCOUNTER — TELEPHONE (OUTPATIENT)
Dept: CARDIOLOGY | Facility: CLINIC | Age: 74
End: 2024-07-18
Payer: MEDICARE

## 2024-07-18 LAB
ALBUMIN SERPL-MCNC: 4.4 G/DL (ref 3.8–4.8)
ALP SERPL-CCNC: 76 IU/L (ref 44–121)
ALT SERPL-CCNC: 14 IU/L (ref 0–44)
AST SERPL-CCNC: 20 IU/L (ref 0–40)
BASOPHILS # BLD AUTO: 0 X10E3/UL (ref 0–0.2)
BASOPHILS NFR BLD AUTO: 1 %
BILIRUB SERPL-MCNC: 0.8 MG/DL (ref 0–1.2)
BUN SERPL-MCNC: 37 MG/DL (ref 8–27)
BUN/CREAT SERPL: 16 (ref 10–24)
CALCIUM SERPL-MCNC: 9.7 MG/DL (ref 8.6–10.2)
CHLORIDE SERPL-SCNC: 100 MMOL/L (ref 96–106)
CHOLEST SERPL-MCNC: 201 MG/DL (ref 100–199)
CK SERPL-CCNC: 170 U/L (ref 41–331)
CO2 SERPL-SCNC: 23 MMOL/L (ref 20–29)
CREAT SERPL-MCNC: 2.27 MG/DL (ref 0.76–1.27)
EGFRCR SERPLBLD CKD-EPI 2021: 30 ML/MIN/1.73
EOSINOPHIL # BLD AUTO: 0.3 X10E3/UL (ref 0–0.4)
EOSINOPHIL NFR BLD AUTO: 4 %
ERYTHROCYTE [DISTWIDTH] IN BLOOD BY AUTOMATED COUNT: 12.9 % (ref 11.6–15.4)
GLOBULIN SER CALC-MCNC: 2.7 G/DL (ref 1.5–4.5)
GLUCOSE SERPL-MCNC: 93 MG/DL (ref 70–99)
HCT VFR BLD AUTO: 39.5 % (ref 37.5–51)
HDLC SERPL-MCNC: 44 MG/DL
HGB BLD-MCNC: 13.2 G/DL (ref 13–17.7)
IMM GRANULOCYTES # BLD AUTO: 0 X10E3/UL (ref 0–0.1)
IMM GRANULOCYTES NFR BLD AUTO: 0 %
LDLC SERPL CALC-MCNC: 133 MG/DL (ref 0–99)
LYMPHOCYTES # BLD AUTO: 1.2 X10E3/UL (ref 0.7–3.1)
LYMPHOCYTES NFR BLD AUTO: 22 %
MCH RBC QN AUTO: 31.1 PG (ref 26.6–33)
MCHC RBC AUTO-ENTMCNC: 33.4 G/DL (ref 31.5–35.7)
MCV RBC AUTO: 93 FL (ref 79–97)
MONOCYTES # BLD AUTO: 0.4 X10E3/UL (ref 0.1–0.9)
MONOCYTES NFR BLD AUTO: 7 %
NEUTROPHILS # BLD AUTO: 3.8 X10E3/UL (ref 1.4–7)
NEUTROPHILS NFR BLD AUTO: 66 %
PLATELET # BLD AUTO: 158 X10E3/UL (ref 150–450)
POTASSIUM SERPL-SCNC: 4 MMOL/L (ref 3.5–5.2)
PROT SERPL-MCNC: 7.1 G/DL (ref 6–8.5)
RBC # BLD AUTO: 4.25 X10E6/UL (ref 4.14–5.8)
SODIUM SERPL-SCNC: 140 MMOL/L (ref 134–144)
TRIGL SERPL-MCNC: 131 MG/DL (ref 0–149)
TSH SERPL DL<=0.005 MIU/L-ACNC: 1.81 UIU/ML (ref 0.45–4.5)
VLDLC SERPL CALC-MCNC: 24 MG/DL (ref 5–40)
WBC # BLD AUTO: 5.7 X10E3/UL (ref 3.4–10.8)

## 2024-07-18 NOTE — TELEPHONE ENCOUNTER
----- Message from Douglas Collins sent at 7/18/2024 10:06 AM EDT -----  Your creatinine again is higher 2,2 from 1.,58 as we d,c the Coreg that might help you, also drink 1 extra glass of water daily

## 2024-07-22 ENCOUNTER — TELEPHONE (OUTPATIENT)
Dept: CARDIOLOGY | Facility: CLINIC | Age: 74
End: 2024-07-22
Payer: MEDICARE

## 2024-07-22 NOTE — TELEPHONE ENCOUNTER
If Mr. Condon calls back, please tell him to disregard that I see where I spoke with him on Friday evening about his labs.

## 2025-01-14 ENCOUNTER — TELEPHONE (OUTPATIENT)
Dept: FAMILY MEDICINE CLINIC | Facility: CLINIC | Age: 75
End: 2025-01-14
Payer: MEDICARE

## 2025-01-14 ENCOUNTER — OFFICE VISIT (OUTPATIENT)
Dept: CARDIOLOGY | Facility: CLINIC | Age: 75
End: 2025-01-14
Payer: MEDICARE

## 2025-01-14 VITALS
BODY MASS INDEX: 20.45 KG/M2 | HEART RATE: 89 BPM | OXYGEN SATURATION: 90 % | WEIGHT: 151 LBS | RESPIRATION RATE: 18 BRPM | HEIGHT: 72 IN | DIASTOLIC BLOOD PRESSURE: 64 MMHG | TEMPERATURE: 97 F | SYSTOLIC BLOOD PRESSURE: 100 MMHG

## 2025-01-14 DIAGNOSIS — E78.00 HYPERCHOLESTEROLEMIA: ICD-10-CM

## 2025-01-14 DIAGNOSIS — J43.2 CENTRILOBULAR EMPHYSEMA: ICD-10-CM

## 2025-01-14 DIAGNOSIS — I51.81 STRESS-INDUCED CARDIOMYOPATHY: ICD-10-CM

## 2025-01-14 DIAGNOSIS — I10 ESSENTIAL HYPERTENSION: Primary | ICD-10-CM

## 2025-01-14 NOTE — TELEPHONE ENCOUNTER
Dr. Collins called to advise pt presented to their office SOB. They did a 6 min walk and pt had to stop after 2mins. His oxygen was dropping in the 80s. His baseline was 92. He said pt used to see pulmonology in [place] but is not longer seeing them. Dr. Collins said pt needs oxygen for his COPD and asked if he could be seen Mon. I scheduled him w/ Mady on 1/20 when she returns at 3:30pm. Cardiology office informed pt.

## 2025-01-14 NOTE — PROGRESS NOTES
[facility] [place]  [facility] CARDIOLOGY  [address] [place] [address]  Dept: [phone]  Dept Fax: [phone]    Silviano Condon  1950    Follow Up Office Visit Note    History of Present Illness:  Silviano Condon is a 74 y.o. male who presents to the clinic for Follow-up. Stress cardiomyopathy- He cardiac wise seems steady recent echo normal Ef recover  but has severe COPD and is wheezing, he ask me to sent him to another lung doctor, in town, he is wheezing today, BP is 120.70,  on valsartan 160.25. he is off coreg due to BP low, and also recover of the Ef, although today BP seems better, his sat is 90 at RA, will get a 6 minutes walking    The following portions of the patient's history were reviewed and updated as appropriate: allergies, current medications, past family history, past medical history, past social history, past surgical history, and problem list.    Medications:  Advair Diskus aerosol powder   albuterol sulfate HFA  aspirin  simvastatin  valsartan-hydrochlorothiazide    Subjective  Allergies   Allergen Reactions    Ciprofloxacin Hives        Past Medical History:   Diagnosis Date    CAD (coronary artery disease)     Chronic osteoarthritis     Colon cancer     COPD (chronic obstructive pulmonary disease)     Heart attack     4-5 months ago    Hx of colonic polyps     Hyperlipidemia     Hypertension     Hypertension     Ischemic cardiomyopathy     Noncompliance     Tobacco abuse     PREVIOUS       Past Surgical History:   Procedure Laterality Date    COLON RESECTION  2014    DR. AMAYA    POLYPECTOMY         Family History   Problem Relation Age of Onset    Diabetes Mother     Hypertension Other         Social History     Socioeconomic History    Marital status:    Tobacco Use    Smoking status: Former     Current packs/day: 0.00     Average packs/day: 1 pack/day for 26.0 years (26.0 ttl pk-yrs)     Types: Cigarettes     Start date: 1971     Quit  "date:      Years since quittin.0    Smokeless tobacco: Never   Vaping Use    Vaping status: Never Used   Substance and Sexual Activity    Alcohol use: Never    Drug use: Never    Sexual activity: Not Currently       Review of Systems   Constitutional: Negative.    HENT: Negative.     Respiratory:  Positive for shortness of breath.    Cardiovascular: Negative.    Endocrine: Negative.    Genitourinary: Negative.    Musculoskeletal: Negative.    Skin: Negative.    Allergic/Immunologic: Negative.    Neurological: Negative.    Hematological: Negative.    Psychiatric/Behavioral: Negative.       Cardiovascular Procedures    ECHO/MUGA:  STRESS TESTS:   CARDIAC CATH:   DEVICES:   HOLTER:   CT/MRI:   VASCULAR:   CARDIOTHORACIC:     Objective  Vitals:    25 1335   BP: 100/64   BP Location: Right arm   Patient Position: Lying   Cuff Size: Adult   Pulse: 89   Resp: 18   Temp: 97 °F (36.1 °C)   TempSrc: Infrared   SpO2: 90%   Weight: 68.5 kg (151 lb)   Height: 182.9 cm (72\")   PainSc: 0-No pain     Body mass index is 20.48 kg/m².     Physical Exam  Vitals reviewed.   Constitutional:       Appearance: Healthy appearance. Not in distress.   Eyes:      Pupils: Pupils are equal, round, and reactive to light.   HENT:    Mouth/Throat:      Pharynx: Oropharynx is clear.   Neck:      Thyroid: Thyroid normal.      Vascular: No JVR. JVD normal.   Pulmonary:      Effort: Pulmonary effort is normal.      Breath sounds: Normal breath sounds. No wheezing. No rhonchi. No rales.   Chest:      Chest wall: Not tender to palpatation.   Cardiovascular:      PMI at left midclavicular line. Normal rate. Regular rhythm. Normal S1. Normal S2.       Murmurs: There is no murmur.      No gallop.  No click. No rub.   Pulses:     Intact distal pulses.      Carotid: 3+ bilaterally.     Radial: 3+ bilaterally.     Femoral: 3+ bilaterally.     Dorsalis pedis: 3+ bilaterally.     Posterior tibial: 3+ bilaterally.  Edema:     Peripheral edema " absent.   Abdominal:      General: Bowel sounds are normal.      Palpations: Abdomen is soft.      Tenderness: There is no abdominal tenderness.   Musculoskeletal: Normal range of motion.         General: No tenderness.      Cervical back: Normal range of motion and neck supple. Skin:     General: Skin is warm and dry.   Neurological:      General: No focal deficit present.      Mental Status: Alert and oriented to person, place and time.        Diagnostic Data    ECG 12 Lead    Date/Time: 1/14/2025 2:12 PM  Performed by: Douglas Collins MD    Authorized by: Douglas Collins MD  Comparison: compared with previous ECG from 1/4/2024  Similar to previous ECG  Rhythm: sinus rhythm  Rate: normal  BPM: 79  QRS axis: normal  Other findings: poor R wave progression    Clinical impression: abnormal EKG        Assessment and Plan  Diagnoses and all orders for this visit:    Essential hypertension- BP is 1=20.80, will keep just Valsartan 160.12.5    Hypercholesterolemia- On Simvastatin 40 mg    Stress-induced cardiomyopathy- with recovery of the Ef to normal,  only on Valsartan 160.125,Cath mild disease    Centrilobular emphysema- will see a Lung doctor, his sat is 90%  -     Ambulatory Referral to Pulmonology         Return in about 6 months (around 7/14/2025) for Recheck with Dr. Collins.    Douglas Collins MD  01/14/2025

## 2025-01-14 NOTE — PROGRESS NOTES
6 minute walk attempted per Dr Collins's orders.  Patient only able to make it 2 laps before he had to stop to catch his breath and his sats had dropped to 83%.  Test was ended and patient back to his exam room and given a albuterol nebulizer treatment.  Some wheezes noted prior to the neb and not a great deal of movement in any of the lung fields.  Will reassess after neb.     Post neb he is moving more air and sats are back up to 95%.  Still states feels tight in his chest but better.

## 2025-01-15 ENCOUNTER — TELEPHONE (OUTPATIENT)
Dept: FAMILY MEDICINE CLINIC | Facility: CLINIC | Age: 75
End: 2025-01-15
Payer: MEDICARE

## 2025-01-16 DIAGNOSIS — J44.9 CHRONIC OBSTRUCTIVE PULMONARY DISEASE, UNSPECIFIED COPD TYPE: Primary | ICD-10-CM

## 2025-01-16 NOTE — TELEPHONE ENCOUNTER
Called spoke with pt he said he would like a referral to a new pulmonologist. Pt said he is coming in on Monday and will discuss further with abdoulaye.

## 2025-02-05 ENCOUNTER — TELEPHONE (OUTPATIENT)
Dept: FAMILY MEDICINE CLINIC | Facility: CLINIC | Age: 75
End: 2025-02-05
Payer: MEDICARE

## 2025-02-10 ENCOUNTER — OFFICE VISIT (OUTPATIENT)
Dept: FAMILY MEDICINE CLINIC | Facility: CLINIC | Age: 75
End: 2025-02-10
Payer: MEDICARE

## 2025-02-10 VITALS
DIASTOLIC BLOOD PRESSURE: 74 MMHG | WEIGHT: 161 LBS | SYSTOLIC BLOOD PRESSURE: 118 MMHG | HEIGHT: 72 IN | HEART RATE: 81 BPM | OXYGEN SATURATION: 98 % | BODY MASS INDEX: 21.81 KG/M2

## 2025-02-10 DIAGNOSIS — Z11.59 ENCOUNTER FOR HEPATITIS C SCREENING TEST FOR LOW RISK PATIENT: ICD-10-CM

## 2025-02-10 DIAGNOSIS — I10 HYPERTENSION, ESSENTIAL: ICD-10-CM

## 2025-02-10 DIAGNOSIS — E78.2 HYPERLIPIDEMIA, MIXED: ICD-10-CM

## 2025-02-10 DIAGNOSIS — R05.3 CHRONIC COUGH: ICD-10-CM

## 2025-02-10 DIAGNOSIS — J43.2 CENTRILOBULAR EMPHYSEMA: ICD-10-CM

## 2025-02-10 DIAGNOSIS — Z12.5 PROSTATE CANCER SCREENING: ICD-10-CM

## 2025-02-10 DIAGNOSIS — R53.83 FATIGUE, UNSPECIFIED TYPE: Primary | ICD-10-CM

## 2025-02-10 DIAGNOSIS — R60.9 EDEMA, UNSPECIFIED TYPE: ICD-10-CM

## 2025-02-10 PROCEDURE — 3074F SYST BP LT 130 MM HG: CPT | Performed by: PHYSICIAN ASSISTANT

## 2025-02-10 PROCEDURE — 1126F AMNT PAIN NOTED NONE PRSNT: CPT | Performed by: PHYSICIAN ASSISTANT

## 2025-02-10 PROCEDURE — 3078F DIAST BP <80 MM HG: CPT | Performed by: PHYSICIAN ASSISTANT

## 2025-02-10 PROCEDURE — 99214 OFFICE O/P EST MOD 30 MIN: CPT | Performed by: PHYSICIAN ASSISTANT

## 2025-02-10 NOTE — PROGRESS NOTES
"Chief Complaint  Follow-up    Subjective          Silviano Condon presents to [facility] PRIMARY CARE  History of Present Illness  Patient in today to discuss  his ongoing issues with COPD. He has been referred to a new pulmonologist- he is interested to see if may qualify for any other kinds of medication as states past year he feels the symptoms of shortness of breath have progressed. He states had to go to ER last month for flare up and started on steroid. He then went to cardiology and was told he needed to f/up with pulmonology. He denies fever. He states on occasion will produce some phlegm- wanted to make sure not getting into pneumonia and trying to keep from having to go back to ER . He states takes advair daily and albuterol throughout the day. He also states has been bothered by fatigue over past several months and would like to get some labs drawn today. He denies chest pain. He states has had issues with recurrent swelling to both lower legs for past 8+ months and states forgot to mention to cardiology when he saw them- states props them up and that does help      Objective   Vital Signs:   /74   Pulse 81   Ht 182.9 cm (72\")   Wt 73 kg (161 lb)   SpO2 98%   BMI 21.84 kg/m²     Body mass index is 21.84 kg/m².    Review of Systems   Constitutional:  Positive for fatigue. Negative for fever.   HENT:  Negative for congestion and sore throat.    Respiratory:  Positive for cough and shortness of breath.    Cardiovascular:  Negative for chest pain and palpitations.   Gastrointestinal:  Negative for abdominal pain, diarrhea, nausea and vomiting.   Neurological:  Negative for dizziness and headache.       Past History:  Medical History: has a past medical history of CAD (coronary artery disease), Chronic osteoarthritis, Colon cancer, COPD (chronic obstructive pulmonary disease), Heart attack, colonic polyps, Hyperlipidemia, Hypertension, Hypertension, Ischemic cardiomyopathy, " Noncompliance, and Tobacco abuse.   Surgical History: has a past surgical history that includes Polypectomy and Colectomy (2014).   Family History: family history includes Diabetes in his mother; Hypertension in an other family member.   Social History: reports that he quit smoking about 28 years ago. His smoking use included cigarettes. He started smoking about 54 years ago. He has a 26 pack-year smoking history. He has never used smokeless tobacco. He reports that he does not drink alcohol and does not use drugs.      Current Outpatient Medications:     Advair Diskus 250-50 MCG/ACT DISKUS, Inhale 2 puffs 2 (Two) Times a Day., Disp: , Rfl:     albuterol sulfate  (90 Base) MCG/ACT inhaler, Inhale 2 puffs Every 4 (Four) Hours As Needed for Wheezing., Disp: , Rfl:     aspirin 81 MG chewable tablet, Chew 1 tablet 2 (Two) Times a Day., Disp: , Rfl:     simvastatin (ZOCOR) 40 MG tablet, Take 1 tablet by mouth Every Night., Disp: 90 tablet, Rfl: 3    valsartan-hydrochlorothiazide (DIOVAN-HCT) 160-12.5 MG per tablet, Take 1 tablet by mouth Daily., Disp: 90 tablet, Rfl: 2  Allergies: Ciprofloxacin    Physical Exam  Constitutional:       Appearance: Normal appearance.   HENT:      Right Ear: Tympanic membrane normal.      Left Ear: Tympanic membrane normal.      Mouth/Throat:      Mouth: Mucous membranes are moist.   Eyes:      Pupils: Pupils are equal, round, and reactive to light.   Cardiovascular:      Rate and Rhythm: Normal rate and regular rhythm.      Heart sounds: Normal heart sounds.      Comments: Minimal edema to both lower legs- nonpitting  Pulmonary:      Effort: Pulmonary effort is normal.      Comments: Decreased breath sounds bilaterally   Neurological:      Mental Status: He is alert.             Assessment and Plan   Diagnoses and all orders for this visit:    1. Fatigue, unspecified type (Primary)  -     CBC & Differential  -     Comprehensive Metabolic Panel  -     TSH  -     Vitamin B12  Will  check some labs today-- encouraged healthy diet  2. Hypertension, essential  Continue current medication and recommend cardiology follow up with chronic edema; will be checking labs today  3. Hyperlipidemia, mixed    4. Centrilobular emphysema  He has an appointment tomorrow with pulmonology for further evaluation and states is going to discuss getting  oxygen at home  with them as well   5. Chronic cough  -     Cancel: XR Chest PA & Lateral (In Office)  -     XR Chest PA & Lateral (In Office)  Decreased breath sounds noted today- will check chest xray and keep f/up with pulmonology tomorrow   6. Encounter for hepatitis C screening test for low risk patient  -     Hepatitis C Antibody    7. Prostate cancer screening  -     PSA Screen  Will check PSA with labs.   8. Edema, unspecified type  Minimal edema noted today- he states it has come/gone over past 8+ months- some days more noticeable than others;  elevation does help;he states is going to contact his cardiologist to discuss  - will be checking labs today; closely monitor symptoms and rtc for any progression of symptoms         Follow Up   No follow-ups on file.  Patient was given instructions and counseling regarding his condition or for health maintenance advice. Please see specific information pulled into the AVS if appropriate.     Minerva Dickerson PA-C

## 2025-02-11 ENCOUNTER — TELEPHONE (OUTPATIENT)
Dept: FAMILY MEDICINE CLINIC | Facility: CLINIC | Age: 75
End: 2025-02-11
Payer: MEDICARE

## 2025-02-11 DIAGNOSIS — R97.20 PSA ELEVATION: Primary | ICD-10-CM

## 2025-02-11 LAB
ALBUMIN SERPL-MCNC: 4.2 G/DL (ref 3.8–4.8)
ALP SERPL-CCNC: 73 IU/L (ref 44–121)
ALT SERPL-CCNC: 11 IU/L (ref 0–44)
AST SERPL-CCNC: 13 IU/L (ref 0–40)
BASOPHILS # BLD AUTO: 0 X10E3/UL (ref 0–0.2)
BASOPHILS NFR BLD AUTO: 1 %
BILIRUB SERPL-MCNC: 0.5 MG/DL (ref 0–1.2)
BUN SERPL-MCNC: 23 MG/DL (ref 8–27)
BUN/CREAT SERPL: 15 (ref 10–24)
CALCIUM SERPL-MCNC: 9.6 MG/DL (ref 8.6–10.2)
CHLORIDE SERPL-SCNC: 101 MMOL/L (ref 96–106)
CO2 SERPL-SCNC: 27 MMOL/L (ref 20–29)
CREAT SERPL-MCNC: 1.56 MG/DL (ref 0.76–1.27)
EGFRCR SERPLBLD CKD-EPI 2021: 46 ML/MIN/1.73
EOSINOPHIL # BLD AUTO: 0.3 X10E3/UL (ref 0–0.4)
EOSINOPHIL NFR BLD AUTO: 8 %
ERYTHROCYTE [DISTWIDTH] IN BLOOD BY AUTOMATED COUNT: 12.4 % (ref 11.6–15.4)
GLOBULIN SER CALC-MCNC: 2.6 G/DL (ref 1.5–4.5)
GLUCOSE SERPL-MCNC: 93 MG/DL (ref 70–99)
HCT VFR BLD AUTO: 39.9 % (ref 37.5–51)
HCV IGG SERPL QL IA: NON REACTIVE
HGB BLD-MCNC: 13.2 G/DL (ref 13–17.7)
IMM GRANULOCYTES # BLD AUTO: 0 X10E3/UL (ref 0–0.1)
IMM GRANULOCYTES NFR BLD AUTO: 0 %
LYMPHOCYTES # BLD AUTO: 1.2 X10E3/UL (ref 0.7–3.1)
LYMPHOCYTES NFR BLD AUTO: 31 %
MCH RBC QN AUTO: 31.3 PG (ref 26.6–33)
MCHC RBC AUTO-ENTMCNC: 33.1 G/DL (ref 31.5–35.7)
MCV RBC AUTO: 95 FL (ref 79–97)
MONOCYTES # BLD AUTO: 0.3 X10E3/UL (ref 0.1–0.9)
MONOCYTES NFR BLD AUTO: 8 %
NEUTROPHILS # BLD AUTO: 2 X10E3/UL (ref 1.4–7)
NEUTROPHILS NFR BLD AUTO: 52 %
PLATELET # BLD AUTO: 165 X10E3/UL (ref 150–450)
POTASSIUM SERPL-SCNC: 4.3 MMOL/L (ref 3.5–5.2)
PROT SERPL-MCNC: 6.8 G/DL (ref 6–8.5)
PSA SERPL-MCNC: 1.6 NG/ML (ref 0–4)
RBC # BLD AUTO: 4.22 X10E6/UL (ref 4.14–5.8)
SODIUM SERPL-SCNC: 141 MMOL/L (ref 134–144)
TSH SERPL DL<=0.005 MIU/L-ACNC: 2.39 UIU/ML (ref 0.45–4.5)
VIT B12 SERPL-MCNC: 281 PG/ML (ref 232–1245)
WBC # BLD AUTO: 3.8 X10E3/UL (ref 3.4–10.8)

## 2025-02-11 NOTE — TELEPHONE ENCOUNTER
Pt informed and voiced understanding.     Pt would like to wait on the kidney specialist for now until he sees the lung specialist.         ----- Message from Minerva Dickerson sent at 2/11/2025  2:45 PM EST -----  Please let him know his kidney function test- creatinine continues to show elevation at 1.56, nml <1.27---  encourage good hydration and please see if interested in fup with kidney specialist and can put in referral; blood count and thyroid test were normal; PSA in normal range but has gone up  since last check- would recommend to recheck in one month and if continues at similar level would recommend urology consult; b12 at 281-- like to keep above 400-- recommend to take  daily otc  vit b12 1000mcg and recheck level in 3 months; thanks

## 2025-02-11 NOTE — TELEPHONE ENCOUNTER
LVM for pt to call back  HUB TO RELAY  Please let know chest xray showed lungs adequately expanded and clear-- no acute process noted--- - cardiomediastinal contours stable- please keep f/up with pulmonology; lab results still pending; thanks

## 2025-02-19 ENCOUNTER — OFFICE VISIT (OUTPATIENT)
Dept: PULMONOLOGY | Facility: CLINIC | Age: 75
End: 2025-02-19
Payer: MEDICARE

## 2025-02-19 VITALS
BODY MASS INDEX: 21.54 KG/M2 | HEIGHT: 72 IN | WEIGHT: 159 LBS | SYSTOLIC BLOOD PRESSURE: 122 MMHG | HEART RATE: 88 BPM | OXYGEN SATURATION: 95 % | TEMPERATURE: 97.6 F | DIASTOLIC BLOOD PRESSURE: 82 MMHG

## 2025-02-19 DIAGNOSIS — J43.2 CENTRILOBULAR EMPHYSEMA: Primary | ICD-10-CM

## 2025-02-19 RX ORDER — IPRATROPIUM BROMIDE AND ALBUTEROL SULFATE 2.5; .5 MG/3ML; MG/3ML
3 SOLUTION RESPIRATORY (INHALATION) EVERY 4 HOURS PRN
COMMUNITY

## 2025-02-19 RX ORDER — FLUTICASONE FUROATE, UMECLIDINIUM BROMIDE AND VILANTEROL TRIFENATATE 200; 62.5; 25 UG/1; UG/1; UG/1
1 POWDER RESPIRATORY (INHALATION)
Qty: 60 EACH | Refills: 3 | Status: SHIPPED | OUTPATIENT
Start: 2025-02-19

## 2025-02-19 NOTE — PROGRESS NOTES
Initial Pulmonary Consult Note    Subjective   Reason for consultation/Chief Complaint: Shortness of Breath    Silviano Condon is a 75 y.o. male is being seen for consultation today at the request of Douglas Collins MD    History of Present Illness    Mr. Condon is a 76yo M who is referred to establish care for COPD.     He is currently on Advair 250/50. He was previously on Trelegy and felt this worked better. He reports that he has been treated for pneumonia twice. He continues to have shortness of breath, cough and wheezing.     Active Ambulatory Problems     Diagnosis Date Noted    Stress-induced cardiomyopathy 2021    Essential hypertension 2022    Hypercholesterolemia 2022    Centrilobular emphysema 2023    Medicare annual wellness visit, initial 2023     Resolved Ambulatory Problems     Diagnosis Date Noted    No Resolved Ambulatory Problems     Past Medical History:   Diagnosis Date    CAD (coronary artery disease)     Chronic osteoarthritis     Colon cancer     COPD (chronic obstructive pulmonary disease)     Heart attack     Hx of colonic polyps     Hyperlipidemia     Hypertension     Hypertension     Ischemic cardiomyopathy     Noncompliance     Tobacco abuse        Past Surgical History:   Procedure Laterality Date    COLON RESECTION      DR. AMAYA    POLYPECTOMY         Family History   Problem Relation Age of Onset    Diabetes Mother     Hypertension Other        Social History     Socioeconomic History    Marital status:    Tobacco Use    Smoking status: Former     Current packs/day: 0.00     Average packs/day: 1 pack/day for 26.0 years (26.0 ttl pk-yrs)     Types: Cigarettes     Start date:      Quit date:      Years since quittin.1    Smokeless tobacco: Never   Vaping Use    Vaping status: Never Used   Substance and Sexual Activity    Alcohol use: Never    Drug use: Never    Sexual activity: Not Currently       Allergies   Allergen  "Reactions    Ciprofloxacin Hives       Current Outpatient Medications   Medication Sig Dispense Refill    albuterol (PROVENTIL) 2 MG tablet Take 1 tablet by mouth 2 (Two) Times a Day.      albuterol sulfate  (90 Base) MCG/ACT inhaler Inhale 2 puffs Every 4 (Four) Hours As Needed for Wheezing.      aspirin 81 MG chewable tablet Chew 1 tablet 2 (Two) Times a Day.      ipratropium-albuterol (DUO-NEB) 0.5-2.5 mg/3 ml nebulizer Take 3 mL by nebulization Every 4 (Four) Hours As Needed for Wheezing.      simvastatin (ZOCOR) 40 MG tablet Take 1 tablet by mouth Every Night. 90 tablet 3    valsartan-hydrochlorothiazide (DIOVAN-HCT) 160-12.5 MG per tablet Take 1 tablet by mouth Daily. 90 tablet 2    Fluticasone-Umeclidin-Vilant (Trelegy Ellipta) 200-62.5-25 MCG/ACT inhaler Inhale 1 puff Daily. 60 each 3     No current facility-administered medications for this visit.       Review of Systems  All other systems were reviewed and are negative.  Exceptions are included in the HPI or as otherwise noted above.     Objective   Blood pressure 122/82, pulse 88, temperature 97.6 °F (36.4 °C), height 182.9 cm (72\"), weight 72.1 kg (159 lb), SpO2 95%.  Physical Exam  Vitals and nursing note reviewed.   Constitutional:       General: He is not in acute distress.     Appearance: He is well-developed.   HENT:      Head: Normocephalic and atraumatic.   Eyes:      General: No scleral icterus.     Conjunctiva/sclera: Conjunctivae normal.      Pupils: Pupils are equal, round, and reactive to light.   Neck:      Thyroid: No thyromegaly.      Trachea: No tracheal deviation.   Cardiovascular:      Rate and Rhythm: Normal rate and regular rhythm.      Heart sounds: Normal heart sounds.   Pulmonary:      Effort: Pulmonary effort is normal. No respiratory distress.      Breath sounds: Decreased breath sounds present. No wheezing.   Abdominal:      General: Bowel sounds are normal.      Palpations: Abdomen is soft.      Tenderness: There is no " abdominal tenderness.   Musculoskeletal:         General: Normal range of motion.      Cervical back: Normal range of motion and neck supple.   Lymphadenopathy:      Cervical: No cervical adenopathy.   Skin:     General: Skin is warm and dry.      Findings: No erythema or rash.   Neurological:      Mental Status: He is alert and oriented to person, place, and time.      Motor: No abnormal muscle tone.      Coordination: Coordination normal.   Psychiatric:         Speech: Speech normal.         Behavior: Behavior normal.         Judgment: Judgment normal.         PFTs:  Performed in clinic and personally reviewed.   There is severe to very severe airway obstruction.  There is no restriction. Air trapping is present.   The DLCO is reduced.     Imaging:  Chest xray from 2/10/25 reviewed. There is no acute cardiopulmonary abnormality.     Assessment & Plan   Diagnoses and all orders for this visit:    1. Centrilobular emphysema (Primary)    Other orders  -     Fluticasone-Umeclidin-Vilant (Trelegy Ellipta) 200-62.5-25 MCG/ACT inhaler; Inhale 1 puff Daily.  Dispense: 60 each; Refill: 3        Discussion:  Mr. Condon is a 76yo M who is referred to establish care for COPD.     1. Stage 3/4 COPD  - FEV1 severely reduced at 25% predicted.   - Change Advair to Trelegy 200. Sample given in clinic and Rx sent.   - Albuterol as needed.   - Previously noted to have an eosinophil count of 300 and may benefit from Dupixent if no improvement with inhaler therapy alone.   - Check an Alpha-1 kit.     2. HFpEF  - Echo with grade I diastolic dysfunction.   - Appears euvolemic.     3. Reduced DLCO  - Monitor for the need for supplemental oxygen. 95% on room air today.     Follow up in 1-2 months to assess the response to therapy.        Silviano Condon  reports that he quit smoking about 28 years ago. His smoking use included cigarettes. He started smoking about 54 years ago. He has a 26 pack-year smoking history. He has never used  smokeless tobacco.     I have spent 62 minutes reviewing the patient record, face to face with the patient in discussion of test results and plans for further management and in documentation and coordination of care. Excluding time spent on other separate services such as performing procedures or test interpretation, if applicable.        Romy V Case, DO  Pulmonary and Critical Care Medicine  Note Electronically Signed

## 2025-02-20 ENCOUNTER — TELEPHONE (OUTPATIENT)
Dept: FAMILY MEDICINE CLINIC | Facility: CLINIC | Age: 75
End: 2025-02-20
Payer: MEDICARE

## 2025-02-24 ENCOUNTER — OFFICE VISIT (OUTPATIENT)
Dept: FAMILY MEDICINE CLINIC | Facility: CLINIC | Age: 75
End: 2025-02-24
Payer: MEDICARE

## 2025-02-24 VITALS
SYSTOLIC BLOOD PRESSURE: 138 MMHG | DIASTOLIC BLOOD PRESSURE: 80 MMHG | WEIGHT: 162 LBS | HEART RATE: 57 BPM | OXYGEN SATURATION: 100 % | HEIGHT: 72 IN | BODY MASS INDEX: 21.94 KG/M2

## 2025-02-24 DIAGNOSIS — Z00.00 ENCOUNTER FOR SUBSEQUENT ANNUAL WELLNESS VISIT (AWV) IN MEDICARE PATIENT: Primary | ICD-10-CM

## 2025-02-24 DIAGNOSIS — M25.561 CHRONIC PAIN OF BOTH KNEES: ICD-10-CM

## 2025-02-24 DIAGNOSIS — E78.00 HYPERCHOLESTEROLEMIA: ICD-10-CM

## 2025-02-24 DIAGNOSIS — G89.29 CHRONIC PAIN OF BOTH KNEES: ICD-10-CM

## 2025-02-24 DIAGNOSIS — M25.562 CHRONIC PAIN OF BOTH KNEES: ICD-10-CM

## 2025-02-24 DIAGNOSIS — J43.2 CENTRILOBULAR EMPHYSEMA: ICD-10-CM

## 2025-02-24 DIAGNOSIS — I10 ESSENTIAL HYPERTENSION: ICD-10-CM

## 2025-02-24 PROCEDURE — 1170F FXNL STATUS ASSESSED: CPT | Performed by: PHYSICIAN ASSISTANT

## 2025-02-24 PROCEDURE — 3079F DIAST BP 80-89 MM HG: CPT | Performed by: PHYSICIAN ASSISTANT

## 2025-02-24 PROCEDURE — G0439 PPPS, SUBSEQ VISIT: HCPCS | Performed by: PHYSICIAN ASSISTANT

## 2025-02-24 PROCEDURE — 1126F AMNT PAIN NOTED NONE PRSNT: CPT | Performed by: PHYSICIAN ASSISTANT

## 2025-02-24 PROCEDURE — 3075F SYST BP GE 130 - 139MM HG: CPT | Performed by: PHYSICIAN ASSISTANT

## 2025-02-24 PROCEDURE — 99213 OFFICE O/P EST LOW 20 MIN: CPT | Performed by: PHYSICIAN ASSISTANT

## 2025-02-24 NOTE — PROGRESS NOTES
Subjective   The ABCs of the Annual Wellness Visit  Medicare Wellness Visit      Silviano Condon is a 75 y.o. patient who presents for a Medicare Wellness Visit.    The following portions of the patient's history were reviewed and   updated as appropriate: allergies, current medications, past family history, past medical history, past social history, past surgical history, and problem list.    He states his breathing has improved since f/up with pulmonology and switching to Trelegy.  States has not had to use albuterol past few days.     Compared to one year ago, the patient's physical   health is the same.  Compared to one year ago, the patient's mental   health is the same.    Recent Hospitalizations:  He was not admitted to the hospital during the last year.     Current Medical Providers:  Patient Care Team:  Minerva Dickerson PA-C as PCP - General (Physician Assistant)    Outpatient Medications Prior to Visit   Medication Sig Dispense Refill    albuterol (PROVENTIL) 2 MG tablet Take 1 tablet by mouth 2 (Two) Times a Day.      albuterol sulfate  (90 Base) MCG/ACT inhaler Inhale 2 puffs Every 4 (Four) Hours As Needed for Wheezing.      aspirin 81 MG chewable tablet Chew 1 tablet 2 (Two) Times a Day.      Fluticasone-Umeclidin-Vilant (Trelegy Ellipta) 200-62.5-25 MCG/ACT inhaler Inhale 1 puff Daily. 60 each 3    ipratropium-albuterol (DUO-NEB) 0.5-2.5 mg/3 ml nebulizer Take 3 mL by nebulization Every 4 (Four) Hours As Needed for Wheezing.      simvastatin (ZOCOR) 40 MG tablet Take 1 tablet by mouth Every Night. 90 tablet 3    valsartan-hydrochlorothiazide (DIOVAN-HCT) 160-12.5 MG per tablet Take 1 tablet by mouth Daily. 90 tablet 2     No facility-administered medications prior to visit.     No opioid medication identified on active medication list. I have reviewed chart for other potential  high risk medication/s and harmful drug interactions in the elderly.      Aspirin is on active medication list.  "Aspirin use is indicated based on review of current medical condition/s. Pros and cons of this therapy have been discussed today. Benefits of this medication outweigh potential harm.  Patient has been encouraged to continue taking this medication.  .      Patient Active Problem List   Diagnosis    Stress-induced cardiomyopathy    Essential hypertension    Hypercholesterolemia    Centrilobular emphysema    Medicare annual wellness visit, initial    Encounter for subsequent annual wellness visit (AWV) in Medicare patient     Advance Care Planning Advance Directive is not on file.  ACP discussion was held with the patient during this visit. Patient does not have an advance directive, information provided.            Objective   Vitals:    25 1434   BP: 138/80   Pulse: 57   SpO2: 100%   Weight: 73.5 kg (162 lb)   Height: 182.9 cm (72\")   PainSc: 0-No pain       Estimated body mass index is 21.97 kg/m² as calculated from the following:    Height as of this encounter: 182.9 cm (72\").    Weight as of this encounter: 73.5 kg (162 lb).    BMI is within normal parameters. No other follow-up for BMI required.           Does the patient have evidence of cognitive impairment? No                                                                                                Health  Risk Assessment    Smoking Status:  Social History     Tobacco Use   Smoking Status Former    Current packs/day: 0.00    Average packs/day: 1 pack/day for 26.0 years (26.0 ttl pk-yrs)    Types: Cigarettes    Start date:     Quit date:     Years since quittin.1   Smokeless Tobacco Never     Alcohol Consumption:  Social History     Substance and Sexual Activity   Alcohol Use Never       Fall Risk Screen  STEADI Fall Risk Assessment was completed, and patient is at LOW risk for falls.Assessment completed on:2025    Depression Screening   Little interest or pleasure in doing things? Almost all   Feeling down, depressed, or hopeless? " Several days   PHQ-2 Total Score 4   Trouble falling or staying asleep, or sleeping too much? Not at all   Feeling tired or having little energy? Almost all   Poor appetite or overeating? Not at all   Feeling bad about yourself - or that you are a failure or have let yourself or your family down? Not at all   Trouble concentrating on things, such as reading the newspaper or watching television? Not at all   Moving or speaking so slowly that other people could have noticed? Or the opposite - being so fidgety or restless that you have been moving around a lot more than usual? Not at all   Thoughts that you would be better off dead, or of hurting yourself in some way? Not at all   PHQ-9 Total Score 7   If you checked off any problems, how difficult have these problems made it for you to do your work, take care of things at home, or get along with other people? Somewhat difficult      Health Habits and Functional and Cognitive Screenin/24/2025     2:31 PM   Functional & Cognitive Status   Do you have difficulty preparing food and eating? No   Do you have difficulty bathing yourself, getting dressed or grooming yourself? No   Do you have difficulty using the toilet? No   Do you have difficulty moving around from place to place? No   Do you have trouble with steps or getting out of a bed or a chair? No   Current Diet Unhealthy Diet   Dental Exam Not up to date   Eye Exam Up to date   Exercise (times per week) 2 times per week   Current Exercises Include Walking;Other   Do you need help using the phone?  No   Are you deaf or do you have serious difficulty hearing?  No   Do you need help to go to places out of walking distance? No   Do you need help shopping? No   Do you need help preparing meals?  No   Do you need help with housework?  No   Do you need help with laundry? No   Do you need help taking your medications? No   Do you need help managing money? No   Do you ever drive or ride in a car without wearing a  seat belt? No   Have you felt unusual stress, anger or loneliness in the last month? No   Who do you live with? Spouse   If you need help, do you have trouble finding someone available to you? No   Have you been bothered in the last four weeks by sexual problems? No   Do you have difficulty concentrating, remembering or making decisions? No           Age-appropriate Screening Schedule:  Refer to the list below for future screening recommendations based on patient's age, sex and/or medical conditions. Orders for these recommended tests are listed in the plan section. The patient has been provided with a written plan.    Health Maintenance List  Health Maintenance   Topic Date Due    Pneumococcal Vaccine 50+ (1 of 2 - PCV) 01/16/1969    TDAP/TD VACCINES (1 - Tdap) Never done    ZOSTER VACCINE (1 of 2) Never done    ANNUAL WELLNESS VISIT  05/05/2024    INFLUENZA VACCINE  03/31/2025 (Originally 7/1/2024)    COVID-19 Vaccine (3 - 2024-25 season) 05/16/2025 (Originally 9/1/2024)    LIPID PANEL  07/17/2025    COLORECTAL CANCER SCREENING  12/15/2025    HEPATITIS C SCREENING  Completed    AAA SCREEN ONCE  Completed                                                                                                                                                CMS Preventative Services Quick Reference  Risk Factors Identified During Encounter  Immunizations Discussed/Encouraged: Tdap and Shingrix    The above risks/problems have been discussed with the patient.  Pertinent information has been shared with the patient in the After Visit Summary.  An After Visit Summary and PPPS were made available to the patient.    Follow Up:   Next Medicare Wellness visit to be scheduled in 1 year.         Additional E&M Note during same encounter follows:  Patient has additional, significant, and separately identifiable condition(s)/problem(s) that require work above and beyond the Medicare Wellness Visit     Chief Complaint  Annual Exam  "(Patient presents today for medicare wellness.)    Subjective   HPI    Patient also in today to discuss chronic bilateral knee pain and would like to rtc for xrays. States they cause discomfort and stiffen, worse during cold weather. Denies swelling or redness to knees. No new injury.   Review of Systems   Constitutional:  Negative for fatigue and fever.   HENT:  Negative for congestion and sore throat.    Respiratory:  Positive for cough. Negative for shortness of breath and wheezing.    Cardiovascular:  Negative for chest pain.   Gastrointestinal:  Negative for abdominal pain, blood in stool, nausea and vomiting.   Genitourinary:  Negative for dysuria and frequency.   Neurological:  Negative for dizziness.   Psychiatric/Behavioral:  Negative for dysphoric mood and suicidal ideas. The patient is not nervous/anxious.               Objective   Vital Signs:  /80   Pulse 57   Ht 182.9 cm (72\")   Wt 73.5 kg (162 lb)   SpO2 100%   BMI 21.97 kg/m²   Physical Exam  Constitutional:       Appearance: Normal appearance.   HENT:      Right Ear: Tympanic membrane normal.      Left Ear: Tympanic membrane normal.      Mouth/Throat:      Pharynx: Oropharynx is clear.   Eyes:      Conjunctiva/sclera: Conjunctivae normal.      Pupils: Pupils are equal, round, and reactive to light.   Cardiovascular:      Rate and Rhythm: Normal rate and regular rhythm.      Heart sounds: Normal heart sounds.   Pulmonary:      Effort: Pulmonary effort is normal.      Breath sounds: Normal breath sounds.   Abdominal:      Palpations: Abdomen is soft.      Tenderness: There is no abdominal tenderness.   Musculoskeletal:      Comments: FROM of bilateral knees-- no swelling noted; tender to palpation to medial and lateral sides of knees    Neurological:      Mental Status: He is oriented to person, place, and time.   Psychiatric:         Mood and Affect: Mood normal.         Behavior: Behavior normal.                       Assessment and Plan "            Encounter for subsequent annual wellness visit (AWV) in Medicare patient  Updated annual wellness visit checklist.  Immunizations discussed.  Screenings discussed.  Recommend yearly dental and eye exams. Also discussed monitoring of blood pressure and lipids. We addressed patient self-assessment of health status, frailty, and physical functioning. We reviewed psychosocial risks, behavioral risks, instrumental activities of daily living, and patient health risk assessment. Patient was given a personalized prevention plan.            Chronic pain of both knees    Orders:    XR Knee 1 or 2 View Bilateral (In Office)  Will rtc for xrays of bilateral knees and discussed can get in with ortho for further evaluation if needed  Essential hypertension    Continue current medication and f/up with cardiology as recommended.        Centrilobular emphysema    Continue current medication and f/up with pulmonology as directed.        Hypercholesterolemia     Continue current medication and f/up with cardiology as directed.                Follow Up   No follow-ups on file.  Patient was given instructions and counseling regarding his condition or for health maintenance advice. Please see specific information pulled into the AVS if appropriate.

## 2025-02-24 NOTE — ASSESSMENT & PLAN NOTE
Updated annual wellness visit checklist.  Immunizations discussed.  Screenings discussed.  Recommend yearly dental and eye exams. Also discussed monitoring of blood pressure and lipids. We addressed patient self-assessment of health status, frailty, and physical functioning. We reviewed psychosocial risks, behavioral risks, instrumental activities of daily living, and patient health risk assessment. Patient was given a personalized prevention plan.

## 2025-04-23 RX ORDER — ALBUTEROL SULFATE 90 UG/1
2 INHALANT RESPIRATORY (INHALATION) EVERY 6 HOURS PRN
Qty: 8.5 G | Refills: 0 | Status: SHIPPED | OUTPATIENT
Start: 2025-04-23

## 2025-04-23 RX ORDER — VALSARTAN AND HYDROCHLOROTHIAZIDE 160; 12.5 MG/1; MG/1
1 TABLET, FILM COATED ORAL DAILY
Qty: 90 TABLET | Refills: 2 | Status: SHIPPED | OUTPATIENT
Start: 2025-04-23

## 2025-05-08 ENCOUNTER — SPECIALTY PHARMACY (OUTPATIENT)
Dept: PULMONOLOGY | Facility: CLINIC | Age: 75
End: 2025-05-08
Payer: MEDICARE

## 2025-05-08 ENCOUNTER — OFFICE VISIT (OUTPATIENT)
Dept: PULMONOLOGY | Facility: CLINIC | Age: 75
End: 2025-05-08
Payer: MEDICARE

## 2025-05-08 VITALS
OXYGEN SATURATION: 94 % | BODY MASS INDEX: 22.13 KG/M2 | SYSTOLIC BLOOD PRESSURE: 130 MMHG | DIASTOLIC BLOOD PRESSURE: 72 MMHG | TEMPERATURE: 97.7 F | HEIGHT: 72 IN | HEART RATE: 59 BPM | WEIGHT: 163.4 LBS

## 2025-05-08 DIAGNOSIS — J43.2 CENTRILOBULAR EMPHYSEMA: Primary | ICD-10-CM

## 2025-05-08 DIAGNOSIS — D72.19 OTHER EOSINOPHILIA: ICD-10-CM

## 2025-05-08 PROBLEM — J44.9 COPD (CHRONIC OBSTRUCTIVE PULMONARY DISEASE): Status: ACTIVE | Noted: 2025-05-08

## 2025-05-08 RX ORDER — FLUTICASONE FUROATE, UMECLIDINIUM BROMIDE AND VILANTEROL TRIFENATATE 200; 62.5; 25 UG/1; UG/1; UG/1
1 POWDER RESPIRATORY (INHALATION)
Qty: 60 EACH | Refills: 3 | Status: SHIPPED | OUTPATIENT
Start: 2025-05-08

## 2025-05-12 ENCOUNTER — TELEPHONE (OUTPATIENT)
Dept: GENERAL RADIOLOGY | Facility: HOSPITAL | Age: 75
End: 2025-05-12
Payer: MEDICARE

## 2025-05-12 ENCOUNTER — SPECIALTY PHARMACY (OUTPATIENT)
Dept: GENERAL RADIOLOGY | Facility: HOSPITAL | Age: 75
End: 2025-05-12
Payer: MEDICARE

## 2025-05-12 RX ORDER — DUPILUMAB 300 MG/2ML
300 INJECTION, SOLUTION SUBCUTANEOUS
Qty: 4 ML | Refills: 11 | Status: SHIPPED | OUTPATIENT
Start: 2025-05-12

## 2025-05-12 NOTE — PROGRESS NOTES
Specialty Pharmacy Patient Management Program  Pulmonology Initial Assessment     Silviano Condon is a 75 y.o. male with COPD seen by a Pulmonology provider and enrolled in the Pulmonology Patient Management program offered by [facility].  An initial outreach was conducted, including assessment of therapy appropriateness and specialty medication education for Dupixent. The patient was introduced to services offered by [facility], including: regular assessments, refill coordination, curbside pick-up or mail order delivery options, prior authorization maintenance, and financial assistance programs as applicable. The patient was also provided with contact information for the pharmacy team.     Insurance Coverage & Financial Support  [employer] Medicare Part D  LIS    Relevant Past Medical History and Comorbidities  Relevant medical history and concomitant health conditions were discussed with the patient. The patient's chart has been reviewed for relevant past medical history and comorbid health conditions and updated as necessary.   Past Medical History:   Diagnosis Date    CAD (coronary artery disease)     Chronic osteoarthritis     Colon cancer     COPD (chronic obstructive pulmonary disease)     Heart attack     4-5 months ago    Hx of colonic polyps     Hyperlipidemia     Hypertension     Hypertension     Ischemic cardiomyopathy     Noncompliance     Tobacco abuse     PREVIOUS     Social History     Socioeconomic History    Marital status:    Tobacco Use    Smoking status: Former     Current packs/day: 0.00     Average packs/day: 1 pack/day for 26.0 years (26.0 ttl pk-yrs)     Types: Cigarettes     Start date:      Quit date:      Years since quittin.3    Smokeless tobacco: Never   Vaping Use    Vaping status: Never Used   Substance and Sexual Activity    Alcohol use: Never    Drug use: Never    Sexual activity: Not Currently     Problem list reviewed  by Esther Jennings, PharmD on 5/12/2025 at  1:54 PM    Allergies  Known allergies and reactions were discussed with the patient. The patient's chart has been reviewed for  allergy information and updated as necessary.   Allergies   Allergen Reactions    Ciprofloxacin Hives     Allergies reviewed by Esther Jennings, PharmD on 5/12/2025 at  1:53 PM    Relevant Laboratory Values  WBC   Date Value Ref Range Status   02/10/2025 3.8 3.4 - 10.8 x10E3/uL Final     RBC   Date Value Ref Range Status   02/10/2025 4.22 4.14 - 5.80 x10E6/uL Final     Hemoglobin   Date Value Ref Range Status   02/10/2025 13.2 13.0 - 17.7 g/dL Final   09/22/2020 13.5 13.0 - 17.7 g/dL Final     Hematocrit   Date Value Ref Range Status   02/10/2025 39.9 37.5 - 51.0 % Final   09/22/2020 40.7 37.5 - 51.0 % Final     MCV   Date Value Ref Range Status   02/10/2025 95 79 - 97 fL Final   09/22/2020 90.8 79.0 - 97.0 fL Final     MCH   Date Value Ref Range Status   02/10/2025 31.3 26.6 - 33.0 pg Final   09/22/2020 30.1 26.6 - 33.0 pg Final     MCHC   Date Value Ref Range Status   02/10/2025 33.1 31.5 - 35.7 g/dL Final   09/22/2020 33.2 31.5 - 35.7 g/dL Final     RDW   Date Value Ref Range Status   02/10/2025 12.4 11.6 - 15.4 % Final   09/22/2020 12.7 12.3 - 15.4 % Final     RDW-SD   Date Value Ref Range Status   09/22/2020 42.6 37.0 - 54.0 fl Final     MPV   Date Value Ref Range Status   09/22/2020 11.0 6.0 - 12.0 fL Final     Platelets   Date Value Ref Range Status   02/10/2025 165 150 - 450 x10E3/uL Final   09/22/2020 127 (L) 140 - 450 10*3/mm3 Final     Neutrophil Rel %   Date Value Ref Range Status   02/10/2025 52 Not Estab. % Final     Neutrophil %   Date Value Ref Range Status   09/22/2020 80.2 (H) 42.7 - 76.0 % Final     Lymphocyte Rel %   Date Value Ref Range Status   02/10/2025 31 Not Estab. % Final     Lymphocyte %   Date Value Ref Range Status   09/22/2020 8.8 (L) 19.6 - 45.3 % Final     Monocyte Rel %   Date Value Ref Range Status    02/10/2025 8 Not Estab. % Final     Monocyte %   Date Value Ref Range Status   09/22/2020 9.8 5.0 - 12.0 % Final     Eosinophil Rel %   Date Value Ref Range Status   02/10/2025 8 Not Estab. % Final     Eosinophil %   Date Value Ref Range Status   09/22/2020 0.1 (L) 0.3 - 6.2 % Final     Basophil Rel %   Date Value Ref Range Status   02/10/2025 1 Not Estab. % Final     Basophil %   Date Value Ref Range Status   09/22/2020 0.2 0.0 - 1.5 % Final     Immature Grans %   Date Value Ref Range Status   09/22/2020 0.9 (H) 0.0 - 0.5 % Final     Neutrophils Absolute   Date Value Ref Range Status   02/10/2025 2.0 1.4 - 7.0 x10E3/uL Final     Neutrophils, Absolute   Date Value Ref Range Status   09/22/2020 6.57 1.70 - 7.00 10*3/mm3 Final     Lymphocytes Absolute   Date Value Ref Range Status   02/10/2025 1.2 0.7 - 3.1 x10E3/uL Final     Lymphocytes, Absolute   Date Value Ref Range Status   09/22/2020 0.72 0.70 - 3.10 10*3/mm3 Final     Monocytes Absolute   Date Value Ref Range Status   02/10/2025 0.3 0.1 - 0.9 x10E3/uL Final     Monocytes, Absolute   Date Value Ref Range Status   09/22/2020 0.80 0.10 - 0.90 10*3/mm3 Final     Eosinophils Absolute   Date Value Ref Range Status   02/10/2025 0.3 0.0 - 0.4 x10E3/uL Final     Eosinophils, Absolute   Date Value Ref Range Status   09/22/2020 0.01 0.00 - 0.40 10*3/mm3 Final     Basophils Absolute   Date Value Ref Range Status   02/10/2025 0.0 0.0 - 0.2 x10E3/uL Final     Basophils, Absolute   Date Value Ref Range Status   09/22/2020 0.02 0.00 - 0.20 10*3/mm3 Final     Immature Grans, Absolute   Date Value Ref Range Status   09/22/2020 0.07 (H) 0.00 - 0.05 10*3/mm3 Final     nRBC   Date Value Ref Range Status   09/22/2020 0.0 0.0 - 0.2 /100 WBC Final           Lab Assessment  The above labs have been reviewed. No dose adjustments are needed for the specialty medication(s) based on the labs.     Current Medication List  This medication list has been reviewed with the patient and  evaluated for any interactions or necessary modifications/recommendations, and updated to include all prescription medications, OTC medications, and supplements the patient is currently taking.  This list reflects what is contained in the patient's profile, which has also been marked as reviewed to communicate to other providers it is the most up to date version of the patient's current medication therapy.     Current Outpatient Medications:     albuterol (PROVENTIL) 2 MG tablet, Take 1 tablet by mouth 2 (Two) Times a Day., Disp: , Rfl:     albuterol sulfate  (90 Base) MCG/ACT inhaler, INHALE 2 PUFFS BY MOUTH EVERY 6 HOURS AS NEEDED, Disp: 8.5 g, Rfl: 0    aspirin 81 MG chewable tablet, Chew 1 tablet 2 (Two) Times a Day., Disp: , Rfl:     Dupilumab (Dupixent) 300 MG/2ML solution auto-injector injection, Inject 2 mL under the skin into the appropriate area as directed Every 14 (Fourteen) Days., Disp: 4 mL, Rfl: 11    Fluticasone-Umeclidin-Vilant (Trelegy Ellipta) 200-62.5-25 MCG/ACT inhaler, Inhale 1 puff Daily., Disp: 60 each, Rfl: 3    ipratropium-albuterol (DUO-NEB) 0.5-2.5 mg/3 ml nebulizer, Take 3 mL by nebulization Every 4 (Four) Hours As Needed for Wheezing., Disp: , Rfl:     simvastatin (ZOCOR) 40 MG tablet, Take 1 tablet by mouth Every Night., Disp: 90 tablet, Rfl: 3    valsartan-hydrochlorothiazide (DIOVAN-HCT) 160-12.5 MG per tablet, Take 1 tablet by mouth Daily., Disp: 90 tablet, Rfl: 2    Medicines reviewed by Esther Jennings, PharmD on 5/12/2025 at  1:53 PM    Drug Interactions  None at this time     Initial Education Provided for Specialty Medication  The patient has been provided with the following education and any applicable administration techniques (i.e. self-injection) have been demonstrated for the therapies indicated. All questions and concerns have been addressed prior to the patient receiving the medication, and the patient has verbalized understanding of the education and any  materials provided.  Additional patient education shall be provided and documented upon request by the patient, provider or payer.         Dupixent (dupilumab)         Medication Expectations   Why am I taking this medication? You are taking this medication for eosinophillic esophagitis, asthma, COPD, atopic dermatitis, or chronic rhinosinusitis with nasal polyps.   What should I expect while on this medication? You should expect a decrease in the frequency and severity of symptoms.   How does the medication work? Dupilumab is a monoclonal antibody that inhibits interleukin-4 and interleukin-13 binding. This decreases the release of proinflammatory cytokines, chemokines, nitric oxide, and lowers IgE levels in the blood.   How long will I be on this medication for? The amount of time you will be on this medication will be determined by your doctor and your response to the medication.    How do I take this medication? Take as directed on your prescription label. Allow medication to reach room temperature for 30-45 minutes prior to injection. This medication is a subcutaneous injection given in the fatty part of the skin on the top of the thigh or stomach area. May be given in upper arm by provider or caregiver. Separate doses >/= 400 mg into two sites.   What are some possible side effects? Injection site reactions and hypersensitivity reactions, conjunctivitis, signs of a common cold, or gastritis.   What happens if I miss a dose? If you miss a dose, take it as soon as you remember. If it is close to the time for your next dose, skip the missed dose and go back to your normal time.             Medication Safety   What are things I should warn my doctor immediately about? Allergic reaction such has hives or trouble breathing. If you develop symptoms of infection such as a fever or cough that does not go away, chest pain, joint pain, dizziness, swollen glands, or numbness or tingling that is not normal.    What are  things that I should be cautious of? Injection site reaction or conjunctivitis. You may have more chance of getting an infection.  Wash your hands often and stay away from people with infections, colds, or flu.   What are some medications that can interact with this one? Immunosuppressants and vaccines.            Medication Storage/Handling   How should I handle this medication? Keep this medication our of reach of pets/children in original container.  Store upright in the original container to protect from light. Do not freeze or shake. Do not inject into skin that is tender, damaged, bruised, or scarred.  Rotate injection sites.   How does this medication need to be stored? Store in refrigerator and keep dry. If needed, you may store at room temperature for up to 14 days.   How should I dispose of this medication? You can dispose of the empty syringe in a sharps container, and if this is not available you may use an empty hard plastic container such as a milk jug or tide container. Discard any unused portion or if stored outside of refrigerator > 14 days.            Resources/Support   How can I remind myself to take this medication? You can download a reminder doc on your phone or use a calandar  to help with your injection.   Is financial support available?  Yes, [employer] can provide co-pay assistance if you have commercial insurance or patient assistance if you have Medicare or no insurance.    Which vaccines are recommended for me? Talk to your doctor about these vaccines: Flu, Coronavirus (COVID-19), Pneumococcal (pneumonia), Tdap, Hepatitis B, Zoster (shingles). Avoid use of live vaccines while on Dupixent                Adherence and Self-Administration  Adherence related to the patient's specialty therapy was discussed with the patient. The Adherence segment of this outreach has been reviewed and updated.   Is there a concern with patient's ability to self administer the medication correctly and  without issue?: No  Were any potential barriers to adherence identified during the initial assessment or patient education?: No  Are there any concerns regarding the patient's understanding of the importance of medication adherence?: No  Methods for Supporting Patient Adherence and/or Self-Administration: Patient reporting to office for first dose    Goals of Therapy  Goals related to the patient's specialty therapy were discussed with the patient. The Patient Goals segment of this outreach has been reviewed and updated.   Goals Addressed Today        Specialty Pharmacy General Goal      Decrease average frequency of asthma exacerbations to less than 2 in 6 months, decrease frequent use of steroids, and minimize adverse effects (infection, hypersensitivity reactions, arthralgia, ocular effects)    5/12/2025  OV 5/8/25--poorly controlled symptoms of COPD despite maximum inhaler therapy prior to starting Dupixent               Reassessment Plan & Follow-Up  Medication Therapy Changes: Dupixent (dupilumab) 300 mg subcutaneously every 14 days for COPD  Related Plans, Therapy Recommendations, or Therapy Problems to Be Addressed: none at this time  Pharmacist to perform regular reassessments no more than (6) months from the previous assessment.  Care Coordinator to set up future refill outreaches, coordinate prescription delivery, and escalate clinical questions to pharmacist.   Welcome information and patient satisfaction survey to be sent by specialty pharmacy team with patient's initial fill.    Attestation  Therapeutic appropriateness: Appropriate   I attest the patient was actively involved in and has agreed to the above plan of care. If the prescribed therapy is at any point deemed not appropriate based on the current or future assessments, a consultation will be initiated with the patient's specialty care provider to determine the best course of action. The revised plan of therapy will be documented along with any  additional patient education provided. Discussed aforementioned material with patient via telemedicine.    Esther Jennings, PharmD, BCPS  Clinic Specialty Pharmacist, Pulmonology  5/12/2025  13:54 EDT

## 2025-05-13 ENCOUNTER — CLINICAL SUPPORT (OUTPATIENT)
Dept: PULMONOLOGY | Facility: CLINIC | Age: 75
End: 2025-05-13
Payer: MEDICARE

## 2025-05-13 DIAGNOSIS — J43.9 PULMONARY EMPHYSEMA, UNSPECIFIED EMPHYSEMA TYPE: Primary | ICD-10-CM

## 2025-05-19 ENCOUNTER — SPECIALTY PHARMACY (OUTPATIENT)
Dept: PULMONOLOGY | Facility: CLINIC | Age: 75
End: 2025-05-19
Payer: MEDICARE

## 2025-05-19 NOTE — PROGRESS NOTES
Specialty Pharmacy Patient Management Program  Refill Outreach     Silviano was contacted today regarding refills of their medication(s). Dupixet  For doses 5/26/25 and 6/9/25    Refill Questions      Flowsheet Row Most Recent Value   Changes to allergies? No   Changes to medications? No   New conditions or infections since last clinic visit No   Unplanned office visit, urgent care, ED, or hospital admission in the last 4 weeks  No   How does patient/caregiver feel medication is working? Very good   Financial problems or insurance changes  No   Since the previous refill, were any specialty medication doses or scheduled injections missed or delayed?  No   Does this patient require a clinical escalation to a pharmacist? No            Delivery Questions      Flowsheet Row Most Recent Value   Delivery method UPS   Delivery address verified with patient/caregiver? No   Delivery address Home   Number of medications in delivery 1   Medication(s) being filled and delivered Dupilumab (Dupixent)   Doses left of specialty medications 0 (for doses 5/26/25 and 6/9/25)   Copay verified? Yes   Copay amount $12.15   Copay form of payment Credit/debit on file   Delivery Date Selection 05/20/25   Signature Required No   Do you consent to receive electronic handouts?  Yes                 Follow-up: 28 day(s)     Samantha Mace, Pharmacy Technician  5/19/2025  12:09 EDT

## 2025-05-20 ENCOUNTER — TELEPHONE (OUTPATIENT)
Dept: CARDIOLOGY | Facility: CLINIC | Age: 75
End: 2025-05-20
Payer: MEDICARE

## 2025-05-20 RX ORDER — CARVEDILOL 6.25 MG/1
6.25 TABLET ORAL 2 TIMES DAILY WITH MEALS
Qty: 90 TABLET | Refills: 0 | Status: SHIPPED | OUTPATIENT
Start: 2025-05-20 | End: 2025-05-22 | Stop reason: ALTCHOICE

## 2025-05-20 RX ORDER — CARVEDILOL 6.25 MG/1
6.25 TABLET ORAL 2 TIMES DAILY WITH MEALS
COMMUNITY
End: 2025-05-20 | Stop reason: SDUPTHER

## 2025-05-20 NOTE — TELEPHONE ENCOUNTER
PT IS CALLING TO REQUEST A REFILL OF CARVEDILOL     [facility]     SHOWS AS DISCONTINUED     PLEASE ADVISE PT ON WHAT HE SHOULD BE TAKING- ALSO MOVED UP HIS APPOINTMENT

## 2025-05-22 ENCOUNTER — OFFICE VISIT (OUTPATIENT)
Dept: CARDIOLOGY | Facility: CLINIC | Age: 75
End: 2025-05-22
Payer: MEDICARE

## 2025-05-22 VITALS
WEIGHT: 164 LBS | HEART RATE: 54 BPM | BODY MASS INDEX: 22.21 KG/M2 | SYSTOLIC BLOOD PRESSURE: 122 MMHG | OXYGEN SATURATION: 99 % | RESPIRATION RATE: 18 BRPM | HEIGHT: 72 IN | DIASTOLIC BLOOD PRESSURE: 74 MMHG

## 2025-05-22 DIAGNOSIS — E78.00 HYPERCHOLESTEROLEMIA: ICD-10-CM

## 2025-05-22 DIAGNOSIS — I10 ESSENTIAL HYPERTENSION: Primary | ICD-10-CM

## 2025-05-22 DIAGNOSIS — J43.1 PANLOBULAR EMPHYSEMA: ICD-10-CM

## 2025-05-22 DIAGNOSIS — I51.81 STRESS-INDUCED CARDIOMYOPATHY: ICD-10-CM

## 2025-05-22 RX ORDER — CARVEDILOL 3.12 MG/1
3.12 TABLET ORAL 2 TIMES DAILY
Qty: 180 TABLET | Refills: 3 | Status: SHIPPED | OUTPATIENT
Start: 2025-05-22

## 2025-05-22 RX ORDER — VALSARTAN AND HYDROCHLOROTHIAZIDE 80; 12.5 MG/1; MG/1
1 TABLET, FILM COATED ORAL DAILY
Qty: 90 TABLET | Refills: 2 | Status: SHIPPED | OUTPATIENT
Start: 2025-05-22

## 2025-05-22 NOTE — PROGRESS NOTES
[facility]  [facility] CARDIOLOGY  [address] [place] [address]  Dept: [phone]  Dept Fax: [phone]    Silviano Condon  1950    Follow Up Office Visit Note    History of Present Illness:  Silviano Condon is a 75 y.o. male who presents to the clinic for Follow-up and Shortness of Breath.- He has COPD, he has seen a lung doctor in [place] Dr Burr, seems doing better, has history of stress cardiomyopathy with recovery of the Ef to normal EKG sinus bardycardia Hr 49, will lower the Coreg to 3.,125 bid , and also lower valsartan to 80.12.5. as his Bp is also jung the low side 100.60    The following portions of the patient's history were reviewed and updated as appropriate: allergies, current medications, past family history, past medical history, past social history, past surgical history, and problem list.    Medications:  albuterol  albuterol sulfate HFA  aspirin  carvedilol  Dupilumab solution prefilled syringe  Dupixent solution auto-injector  ipratropium-albuterol  simvastatin  Trelegy Ellipta aerosol powder   valsartan-hydrochlorothiazide    Subjective  Allergies   Allergen Reactions   • Ciprofloxacin Hives        Past Medical History:   Diagnosis Date   • CAD (coronary artery disease)    • Chronic osteoarthritis    • Colon cancer    • COPD (chronic obstructive pulmonary disease)    • Heart attack     4-5 months ago   • Hx of colonic polyps    • Hyperlipidemia    • Hypertension    • Hypertension    • Ischemic cardiomyopathy    • Noncompliance    • Tobacco abuse     PREVIOUS       Past Surgical History:   Procedure Laterality Date   • COLON RESECTION  2014    DR. AMAYA   • POLYPECTOMY         Family History   Problem Relation Age of Onset   • Diabetes Mother    • Hypertension Other         Social History     Socioeconomic History   • Marital status:    Tobacco Use   • Smoking status: Former     Current packs/day: 0.00     Average packs/day: 1 pack/day  "for 26.0 years (26.0 ttl pk-yrs)     Types: Cigarettes     Start date:      Quit date:      Years since quittin.4   • Smokeless tobacco: Never   Vaping Use   • Vaping status: Never Used   Substance and Sexual Activity   • Alcohol use: Never   • Drug use: Never   • Sexual activity: Not Currently       Review of Systems   Constitutional: Negative.    HENT: Negative.     Respiratory: Negative.     Cardiovascular: Negative.    Endocrine: Negative.    Genitourinary: Negative.    Musculoskeletal: Negative.    Skin: Negative.    Allergic/Immunologic: Negative.    Neurological: Negative.    Hematological: Negative.    Psychiatric/Behavioral: Negative.       Cardiovascular Procedures    ECHO/MUGA:  STRESS TESTS:   CARDIAC CATH:   DEVICES:   HOLTER:   CT/MRI:   VASCULAR:   CARDIOTHORACIC:     Objective  Vitals:    25 1125   BP: 122/74   BP Location: Right arm   Patient Position: Lying   Cuff Size: Adult   Pulse: 54   Resp: 18   SpO2: 99%   Weight: 74.4 kg (164 lb)   Height: 182.9 cm (72\")   PainSc: 0-No pain     Body mass index is 22.24 kg/m².     Physical Exam  Vitals reviewed.   Constitutional:       Appearance: Healthy appearance. Not in distress.   Eyes:      Pupils: Pupils are equal, round, and reactive to light.   HENT:    Mouth/Throat:      Pharynx: Oropharynx is clear.   Neck:      Thyroid: Thyroid normal.      Vascular: No JVR. JVD normal.   Pulmonary:      Effort: Pulmonary effort is normal.      Breath sounds: Normal breath sounds. No wheezing. No rhonchi. No rales.   Chest:      Chest wall: Not tender to palpatation.   Cardiovascular:      PMI at left midclavicular line. Normal rate. Regular rhythm. Normal S1. Normal S2.       Murmurs: There is no murmur.      No gallop.  No click. No rub.   Pulses:     Intact distal pulses.      Carotid: 3+ bilaterally.     Radial: 3+ bilaterally.     Femoral: 3+ bilaterally.     Dorsalis pedis: 3+ bilaterally.     Posterior tibial: 3+ bilaterally.  Edema:     " Peripheral edema absent.   Abdominal:      General: Bowel sounds are normal.      Palpations: Abdomen is soft.      Tenderness: There is no abdominal tenderness.   Musculoskeletal: Normal range of motion.         General: No tenderness.      Cervical back: Normal range of motion and neck supple. Skin:     General: Skin is warm and dry.   Neurological:      General: No focal deficit present.      Mental Status: Alert and oriented to person, place and time.        Diagnostic Data    ECG 12 Lead    Date/Time: 5/22/2025 12:52 PM  Performed by: Douglas Collins MD    Authorized by: Douglas Collins MD  Comparison: compared with previous ECG from 1/14/2025  Similar to previous ECG  Rhythm: sinus rhythm and sinus bradycardia  Rate: bradycardic  BPM: 49  Conduction: 1st degree AV block  QRS axis: normal  Other findings: poor R wave progression    Clinical impression: abnormal EKG        Assessment and Plan  Diagnoses and all orders for this visit:    Essential hypertension- BP is 100.60, will lower Valsartan to 80.12.5 and also lower Coreg to 3125 bid    Hypercholesterolemia=On Simvastatin 40 mg,    Stress-induced cardiomyopathy- with recovery of the Ef to normal, on Coreg and also Valsartan    Panlobular emphysema    Other orders  -     carvedilol (COREG) 3.125 MG tablet; Take 1 tablet by mouth 2 (Two) Times a Day.  -     valsartan-hydrochlorothiazide (Diovan HCT) 80-12.5 MG per tablet; Take 1 tablet by mouth Daily.         Return in about 6 months (around 11/22/2025) for Recheck with Dr. Collins.    Douglas Collins MD  05/22/2025

## 2025-05-27 ENCOUNTER — CLINICAL SUPPORT (OUTPATIENT)
Dept: PULMONOLOGY | Facility: CLINIC | Age: 75
End: 2025-05-27
Payer: MEDICARE

## 2025-05-27 DIAGNOSIS — J43.9 PULMONARY EMPHYSEMA, UNSPECIFIED EMPHYSEMA TYPE: Primary | ICD-10-CM

## 2025-05-27 PROCEDURE — 96372 THER/PROPH/DIAG INJ SC/IM: CPT | Performed by: INTERNAL MEDICINE

## 2025-06-02 RX ORDER — ALBUTEROL SULFATE 90 UG/1
2 INHALANT RESPIRATORY (INHALATION) EVERY 6 HOURS PRN
Qty: 18 G | Refills: 3 | Status: SHIPPED | OUTPATIENT
Start: 2025-06-02

## 2025-06-10 ENCOUNTER — CLINICAL SUPPORT (OUTPATIENT)
Dept: PULMONOLOGY | Facility: CLINIC | Age: 75
End: 2025-06-10
Payer: MEDICARE

## 2025-06-10 DIAGNOSIS — J43.9 PULMONARY EMPHYSEMA, UNSPECIFIED EMPHYSEMA TYPE: Primary | ICD-10-CM

## 2025-06-11 ENCOUNTER — SPECIALTY PHARMACY (OUTPATIENT)
Dept: PULMONOLOGY | Facility: CLINIC | Age: 75
End: 2025-06-11
Payer: MEDICARE

## 2025-06-11 NOTE — PROGRESS NOTES
Specialty Pharmacy Patient Management Program  Refill Outreach     Silviano was contacted today regarding refills of their medication(s). Dupixent   For doses 6/23/25 and 7/7/25    Refill Questions      Flowsheet Row Most Recent Value   Changes to allergies? No   Changes to medications? No   New conditions or infections since last clinic visit No   Unplanned office visit, urgent care, ED, or hospital admission in the last 4 weeks  No   How does patient/caregiver feel medication is working? Very good   Financial problems or insurance changes  No   Since the previous refill, were any specialty medication doses or scheduled injections missed or delayed?  No   Does this patient require a clinical escalation to a pharmacist? No            Delivery Questions      Flowsheet Row Most Recent Value   Delivery method UPS   Delivery address verified with patient/caregiver? Yes   Delivery address Home   Number of medications in delivery 1   Medication(s) being filled and delivered Dupilumab (Dupixent)   Doses left of specialty medications 0 (for doses 6/23/25 smf 7/7/25)   Copay verified? No   Copay amount $0   Copay form of payment No copayment ($0)   Delivery Date Selection 06/12/25   Signature Required No   Do you consent to receive electronic handouts?  No                 Follow-up: 28 day(s)     Samantha Mace, Pharmacy Technician  6/11/2025  10:08 EDT

## 2025-06-24 ENCOUNTER — CLINICAL SUPPORT (OUTPATIENT)
Dept: PULMONOLOGY | Facility: CLINIC | Age: 75
End: 2025-06-24
Payer: MEDICARE

## 2025-06-24 DIAGNOSIS — J43.9 PULMONARY EMPHYSEMA, UNSPECIFIED EMPHYSEMA TYPE: Primary | ICD-10-CM

## 2025-06-24 PROCEDURE — 96372 THER/PROPH/DIAG INJ SC/IM: CPT | Performed by: NURSE PRACTITIONER

## 2025-07-08 ENCOUNTER — SPECIALTY PHARMACY (OUTPATIENT)
Dept: PULMONOLOGY | Facility: CLINIC | Age: 75
End: 2025-07-08
Payer: MEDICARE

## 2025-07-08 ENCOUNTER — CLINICAL SUPPORT (OUTPATIENT)
Dept: PULMONOLOGY | Facility: CLINIC | Age: 75
End: 2025-07-08
Payer: MEDICARE

## 2025-07-08 DIAGNOSIS — J43.9 PULMONARY EMPHYSEMA, UNSPECIFIED EMPHYSEMA TYPE: Primary | ICD-10-CM

## 2025-07-08 PROCEDURE — 96372 THER/PROPH/DIAG INJ SC/IM: CPT | Performed by: NURSE PRACTITIONER

## 2025-07-08 NOTE — PROGRESS NOTES
Specialty Pharmacy Patient Management Program  Refill Outreach     Silviano was contacted today regarding refills of their medication(s). Dupixent  For doses 7/22/25 and 8/5/25    Refill Questions      Flowsheet Row Most Recent Value   Changes to allergies? No   Changes to medications? No   New conditions or infections since last clinic visit No   Unplanned office visit, urgent care, ED, or hospital admission in the last 4 weeks  No   How does patient/caregiver feel medication is working? Very good   Financial problems or insurance changes  No   Since the previous refill, were any specialty medication doses or scheduled injections missed or delayed?  No   Does this patient require a clinical escalation to a pharmacist? No            Delivery Questions      Flowsheet Row Most Recent Value   Delivery method UPS   Delivery address verified with patient/caregiver? Yes   Delivery address Home   Number of medications in delivery 1   Medication(s) being filled and delivered Dupilumab (Dupixent)   Doses left of specialty medications 0 for doses 7/22/25 and 8/5/25   Copay verified? Yes   Copay amount $0   Copay form of payment No copayment ($0)   Delivery Date Selection 07/09/25   Signature Required No   Do you consent to receive electronic handouts?  No                 Follow-up: 28 day(s)     Samantha Mace, Pharmacy Technician  7/8/2025  08:22 EDT

## 2025-07-24 NOTE — PROGRESS NOTES
[facility]    CHIEF COMPLAINT    Very severe COPD     HISTORY OF PRESENT ILLNESS    HPI:   Silviano Condon is a 75 y.o.male followed by pulmonary regarding his COPD.     He is a former smoker with a 26-pack-year history who quit 1997.    History of very severe COPD and has been on multiple inhalers in the past but most recently Trelegy 200 and albuterol as needed.  Due to persistent symptoms he was approved for Dupixent which was started in May 2025.    MM phenotype.    Interval history:   Patient was last seen in the office by Dr. Burr in May 2025. Has been on Dupixent for ~2 months but does not notice much difference on this.     Has noticed worsening swelling on his lower extremities over the past few months. He is followed by Dr. Collins. Echo from 2024 did show grade I diastolic dysfunction dating back to 2020.     LHC from 2020 did show some diffuse CAD in multiple territories without high-grade focal lesion.     He is very active and still golfs regularly and continue to work on cars and etc.     Denies recent ED visits, hospitalizations, or exacerbations.     Denies fever, chills, night sweats, or hemoptysis. No recent sick contacts. No chest pain or palpitations. Denies lower extremity swelling or calf tenderness.     Patient Active Problem List   Diagnosis    Stress-induced cardiomyopathy    Essential hypertension    Hypercholesterolemia    Centrilobular emphysema    Medicare annual wellness visit, initial    Encounter for subsequent annual wellness visit (AWV) in Medicare patient    Other eosinophilia    COPD (chronic obstructive pulmonary disease)    Shortness of breath       Allergies   Allergen Reactions    Ciprofloxacin Hives       Current Outpatient Medications:     albuterol (PROVENTIL) 2 MG tablet, Take 1 tablet by mouth 2 (Two) Times a Day., Disp: , Rfl:     albuterol sulfate  (90 Base) MCG/ACT inhaler, INHALE 2 PUFFS BY MOUTH EVERY 6 HOURS AS NEEDED, Disp: 18 g, Rfl: 3     aspirin 81 MG chewable tablet, Chew 1 tablet 2 (Two) Times a Day., Disp: , Rfl:     carvedilol (COREG) 3.125 MG tablet, Take 1 tablet by mouth 2 (Two) Times a Day., Disp: 180 tablet, Rfl: 3    Fluticasone-Umeclidin-Vilant (Trelegy Ellipta) 200-62.5-25 MCG/ACT inhaler, Inhale 1 puff Daily., Disp: 60 each, Rfl: 3    ipratropium-albuterol (DUO-NEB) 0.5-2.5 mg/3 ml nebulizer, Take 3 mL by nebulization Every 4 (Four) Hours As Needed for Wheezing., Disp: , Rfl:     simvastatin (ZOCOR) 40 MG tablet, Take 1 tablet by mouth Every Night., Disp: 90 tablet, Rfl: 3    valsartan-hydrochlorothiazide (Diovan HCT) 80-12.5 MG per tablet, Take 1 tablet by mouth Daily., Disp: 90 tablet, Rfl: 2    Dupilumab (Dupixent) 300 MG/2ML solution auto-injector injection, Inject 2 mL under the skin into the appropriate area as directed Every 14 (Fourteen) Days. (Patient not taking: Reported on 2025), Disp: 4 mL, Rfl: 11    Current Facility-Administered Medications:     Dupilumab (DUPIXENT) injection 300 mg, 300 mg, Subcutaneous, Q14 Days, Magalie Ching APRN, 300 mg at 25 0935  MEDICATION LIST AND ALLERGIES REVIEWED.    Social History     Tobacco Use    Smoking status: Former     Current packs/day: 0.00     Average packs/day: 1 pack/day for 26.0 years (26.0 ttl pk-yrs)     Types: Cigarettes     Start date:      Quit date:      Years since quittin.5    Smokeless tobacco: Never   Vaping Use    Vaping status: Never Used   Substance Use Topics    Alcohol use: Never    Drug use: Never       FAMILY AND SOCIAL HISTORY REVIEWED.    Review of Systems   Constitutional:  Negative for chills, fatigue and fever.   Respiratory:  Positive for shortness of breath. Negative for cough, chest tightness and wheezing.    Cardiovascular:  Negative for chest pain, palpitations and leg swelling.   Skin:  Negative for color change.   Psychiatric/Behavioral:  Negative for sleep disturbance.    All other systems reviewed and are negative.      BP  "128/66 (BP Location: Right arm, Patient Position: Sitting, Cuff Size: Adult)   Pulse 70   Temp 97.3 °F (36.3 °C) (Temporal)   Ht 182.9 cm (72\")   Wt 78.2 kg (172 lb 4.8 oz)   SpO2 94% Comment: room air at rest  BMI 23.37 kg/m²     Immunization History   Administered Date(s) Administered    COVID-19 (MODERNA) 1st,2nd,3rd Dose Monovalent 11/15/2022    COVID-19 (PFIZER) Purple Cap Monovalent 03/20/2021    Fluzone High-Dose 65+YRS 10/19/2021    Fluzone High-Dose 65+yrs 10/25/2022    Influenza, Unspecified 11/15/2022    Pneumococcal, Unspecified 11/15/2022       Physical Exam  Vitals reviewed.   Constitutional:       General: He is not in acute distress.     Appearance: Normal appearance.   Cardiovascular:      Rate and Rhythm: Normal rate and regular rhythm.      Pulses: Normal pulses.      Heart sounds: Normal heart sounds.   Pulmonary:      Effort: Pulmonary effort is normal. No respiratory distress.      Breath sounds: No wheezing, rhonchi or rales.   Skin:     General: Skin is warm and dry.   Neurological:      General: No focal deficit present.      Mental Status: He is alert and oriented to person, place, and time.         RESULTS    PFTs:  2/2025: Very severe airway obstruction with FEV1 0.82, 25%. Mild restriction with TLC 5.59, 74%. Air trapping with %. Severely reduced DLCO that partially corrects for AV.     Imaging:   CXR PA/lateral 2/10/25  Impression:  No acute cardiopulmonary abnormality is identified.    Cardiac:   Results for orders placed in visit on 01/05/24  Adult Transthoracic Echo Complete W/ Cont if Necessary Per Protocol 01/05/2024  4:35 PM  Interpretation Summary    Left ventricular systolic function is normal. Calculated left ventricular EF = 62%    Left ventricular diastolic function is consistent with (grade I) impaired relaxation.    Mild aortic valve stenosis is present. Aortic valve area is 2.3 cm2.Mild calcifications of the aortic valve    Peak velocity of the flow distal to " the aortic valve is 219.9 cm/s. Aortic valve maximum pressure gradient is 20 mmHg. Aortic valve mean pressure gradient is 10 mmHg.    The mitral valve peak gradient is 5 mmHg. The mitral valve mean gradient is 2 mmHg. The mitral valve area (PHT) is 3.3 cm2.    Estimated right ventricular systolic pressure from tricuspid regurgitation is normal (<35 mmHg). Calculated right ventricular systolic pressure from tricuspid regurgitation is 33 mmHg.    Normal Rv size and function    No pericardial effusion    No parasternal windows,     [facility] 2020   Diffuse CAD in multiple territories without high-grade focal lesion.     PROBLEM LIST    Problem List Items Addressed This Visit       Centrilobular emphysema    COPD (chronic obstructive pulmonary disease) - Primary    Relevant Orders    Holter Monitor - 72 Hour Up To 15 Days    Shortness of breath       DISCUSSION    Mr. Condon was seen today in follow-up.    Was initiated on Dupixent but does not feel this has improved his respiratory symptoms and actually feels it has contributed to some muscle weakness which is gradually worsened with each dose.    Stage IV COPD  Shortness of breath  Emphysema  - FEV1 severely reduced at 25% predicted.   - Continue Trelegy 200.  Rinse mouth after use prevent thrush.  - Albuterol as needed.   - Previously noted to have an eosinophil count of 300 and due to worsening symptoms was initiated on Dupixent in May 2025, however has not noticed any symptom benefit. Will discontinue.   - I would like to trial him on Ohtuvayre to see if he has any symptom relief.   - MM phenotype  - Does not have symptoms concerning for recurrent bronchitis at this time therefore I do not feel any medication such as suppressive azithromycin or Daliresp would be beneficial at this time.  - If his symptoms are ongoing, given the severity of his obstruction he may benefit from a low-dose steroid moving forward.  - States that he is very active and plays golf multiple  times a week and also works on cars regularly.  I do recommend 30 minutes of low impact exercise most days of the week.  - SpO2 94% on room air today.  Will order 6 MWT at next visit.  Pending results may need to order overnight oximetry for further evaluation to see if he has any underlying hypoxemia.     HFpEF  CAD  - Echo with grade I diastolic dysfunction.  This is stable dating back to 2020.  - Now has complaints of worsening lower extremity swelling and does have 2+ lower extremity edema on exam today.  He is on a low-dose HCTZ and I do encourage he contact his cardiologist for further management as he may need upward titration of a diuretic.  - Also had some diffuse CAD on 2020 [facility] and may need further ischemic workup at this time.  - Does have significant symptoms of palpitations on occasion and given the severity of his lung disease, he is at risk for arrhythmias.  Will order Holter monitor for further evaluation.    Return to clinic in 6 weeks to determine and the symptom relief on Ohtuvayre and go over Holter results.  6 MWT at next visit.  Or may see me sooner if needed.  Should symptoms acutely worsen, I do want him to go to the ED for further evaluation.     I personally spent a total of 31 minutes on patient visit today including chart review, face to face with the patient obtaining the history and physical exam, review of pertinent images and tests, counseling and discussion and/or coordination of care as described above, and documentation.  Total time excludes time spent on other separate services such as performing procedures or test interpretation, if applicable.    Electronically signed by RUPALI Dupree, 07/25/25, 11:04 AM EDT.    Please note that portions of this note were completed with a voice recognition program.      CC: Minerva Dickerson, JACEK

## 2025-07-25 ENCOUNTER — OFFICE VISIT (OUTPATIENT)
Dept: PULMONOLOGY | Facility: CLINIC | Age: 75
End: 2025-07-25
Payer: MEDICARE

## 2025-07-25 VITALS
SYSTOLIC BLOOD PRESSURE: 128 MMHG | HEIGHT: 72 IN | DIASTOLIC BLOOD PRESSURE: 66 MMHG | BODY MASS INDEX: 23.34 KG/M2 | WEIGHT: 172.3 LBS | TEMPERATURE: 97.3 F | OXYGEN SATURATION: 94 % | HEART RATE: 70 BPM

## 2025-07-25 DIAGNOSIS — J43.9 PULMONARY EMPHYSEMA, UNSPECIFIED EMPHYSEMA TYPE: Primary | ICD-10-CM

## 2025-07-25 DIAGNOSIS — R06.02 SHORTNESS OF BREATH: ICD-10-CM

## 2025-07-25 DIAGNOSIS — J43.2 CENTRILOBULAR EMPHYSEMA: ICD-10-CM

## 2025-08-11 ENCOUNTER — TELEPHONE (OUTPATIENT)
Dept: PULMONOLOGY | Facility: CLINIC | Age: 75
End: 2025-08-11
Payer: MEDICARE